# Patient Record
Sex: FEMALE | Race: WHITE | NOT HISPANIC OR LATINO | Employment: FULL TIME | URBAN - METROPOLITAN AREA
[De-identification: names, ages, dates, MRNs, and addresses within clinical notes are randomized per-mention and may not be internally consistent; named-entity substitution may affect disease eponyms.]

---

## 2019-09-27 ENCOUNTER — TELEPHONE (OUTPATIENT)
Dept: FAMILY MEDICINE | Facility: CLINIC | Age: 29
End: 2019-09-27

## 2019-09-27 NOTE — TELEPHONE ENCOUNTER
Patient states she would really like to have you as her provider for her pregnancy as her Muslim only allows her to see a female provider and you have seen her sister Rocio through two of her pregnancies.  Patient has not been seen for her pregnancy yet and she is due in April.  Please call.    Thank you.

## 2019-09-27 NOTE — TELEPHONE ENCOUNTER
Yes I can take her as a patient, she needs to confirm she can deliver at [facility].

## 2019-09-30 NOTE — TELEPHONE ENCOUNTER
Had  serviced call patient at [phone],  no answer, he left a message for patient to check with insurance for coverage at [facility] to deliver baby.   MERCEDES Baugh

## 2019-10-03 NOTE — TELEPHONE ENCOUNTER
Reason for call:  Other   Patient called regarding (reason for call): appointment    Additional comments: pt called in. She is wondering what is the next step in starting her care    Phone number to reach patient:  Home number on file [phone] (home)    Best Time:      Can we leave a detailed message on this number?  YES

## 2019-10-04 NOTE — TELEPHONE ENCOUNTER
Patient is scheduled on 10/08/19 for new prenatal, can deliver at [facility]. Patient was confirmed pregnancy in [place].  Believes she is 16 weeks now. No  is needed.   MERCEDES Baugh

## 2019-10-08 ENCOUNTER — PRENATAL OFFICE VISIT (OUTPATIENT)
Dept: FAMILY MEDICINE | Facility: CLINIC | Age: 29
End: 2019-10-08
Payer: MEDICAID

## 2019-10-08 VITALS
TEMPERATURE: 98.2 F | DIASTOLIC BLOOD PRESSURE: 74 MMHG | WEIGHT: 180.8 LBS | SYSTOLIC BLOOD PRESSURE: 119 MMHG | HEART RATE: 85 BPM | HEIGHT: 65 IN | BODY MASS INDEX: 30.12 KG/M2 | RESPIRATION RATE: 16 BRPM

## 2019-10-08 DIAGNOSIS — Z34.82 ENCOUNTER FOR SUPERVISION OF OTHER NORMAL PREGNANCY, SECOND TRIMESTER: Primary | ICD-10-CM

## 2019-10-08 DIAGNOSIS — L85.3 DRY SKIN: ICD-10-CM

## 2019-10-08 DIAGNOSIS — Z23 NEED FOR PROPHYLACTIC VACCINATION AND INOCULATION AGAINST INFLUENZA: ICD-10-CM

## 2019-10-08 LAB
ERYTHROCYTE [DISTWIDTH] IN BLOOD BY AUTOMATED COUNT: 14.3 % (ref 10–15)
HCT VFR BLD AUTO: 36.3 % (ref 35–47)
HGB BLD-MCNC: 12 G/DL (ref 11.7–15.7)
MCH RBC QN AUTO: 28.9 PG (ref 26.5–33)
MCHC RBC AUTO-ENTMCNC: 33.1 G/DL (ref 31.5–36.5)
MCV RBC AUTO: 88 FL (ref 78–100)
PLATELET # BLD AUTO: 277 10E9/L (ref 150–450)
RBC # BLD AUTO: 4.15 10E12/L (ref 3.8–5.2)
WBC # BLD AUTO: 9.1 10E9/L (ref 4–11)

## 2019-10-08 PROCEDURE — 86762 RUBELLA ANTIBODY: CPT | Performed by: FAMILY MEDICINE

## 2019-10-08 PROCEDURE — 86850 RBC ANTIBODY SCREEN: CPT | Performed by: FAMILY MEDICINE

## 2019-10-08 PROCEDURE — 85027 COMPLETE CBC AUTOMATED: CPT | Performed by: FAMILY MEDICINE

## 2019-10-08 PROCEDURE — 86900 BLOOD TYPING SEROLOGIC ABO: CPT | Performed by: FAMILY MEDICINE

## 2019-10-08 PROCEDURE — 84439 ASSAY OF FREE THYROXINE: CPT | Performed by: FAMILY MEDICINE

## 2019-10-08 PROCEDURE — 86780 TREPONEMA PALLIDUM: CPT | Performed by: FAMILY MEDICINE

## 2019-10-08 PROCEDURE — 87389 HIV-1 AG W/HIV-1&-2 AB AG IA: CPT | Performed by: FAMILY MEDICINE

## 2019-10-08 PROCEDURE — 90471 IMMUNIZATION ADMIN: CPT | Performed by: FAMILY MEDICINE

## 2019-10-08 PROCEDURE — 36415 COLL VENOUS BLD VENIPUNCTURE: CPT | Performed by: FAMILY MEDICINE

## 2019-10-08 PROCEDURE — G0499 HEPB SCREEN HIGH RISK INDIV: HCPCS | Performed by: FAMILY MEDICINE

## 2019-10-08 PROCEDURE — 99207 ZZC FIRST OB VISIT: CPT | Performed by: FAMILY MEDICINE

## 2019-10-08 PROCEDURE — 86901 BLOOD TYPING SEROLOGIC RH(D): CPT | Performed by: FAMILY MEDICINE

## 2019-10-08 PROCEDURE — 87491 CHLMYD TRACH DNA AMP PROBE: CPT | Performed by: FAMILY MEDICINE

## 2019-10-08 PROCEDURE — G0145 SCR C/V CYTO,THINLAYER,RESCR: HCPCS | Performed by: FAMILY MEDICINE

## 2019-10-08 PROCEDURE — 86787 VARICELLA-ZOSTER ANTIBODY: CPT | Performed by: FAMILY MEDICINE

## 2019-10-08 PROCEDURE — 90686 IIV4 VACC NO PRSV 0.5 ML IM: CPT | Performed by: FAMILY MEDICINE

## 2019-10-08 PROCEDURE — 87086 URINE CULTURE/COLONY COUNT: CPT | Performed by: FAMILY MEDICINE

## 2019-10-08 PROCEDURE — 84443 ASSAY THYROID STIM HORMONE: CPT | Performed by: FAMILY MEDICINE

## 2019-10-08 RX ORDER — MINERAL OIL/HYDROPHIL PETROLAT
OINTMENT (GRAM) TOPICAL
Qty: 420 G | Refills: 3 | Status: SHIPPED | OUTPATIENT
Start: 2019-10-08 | End: 2019-11-08

## 2019-10-08 RX ORDER — PNV NO.95/FERROUS FUM/FOLIC AC 28MG-0.8MG
1 TABLET ORAL DAILY
Qty: 100 TABLET | Refills: 3 | Status: SHIPPED | OUTPATIENT
Start: 2019-10-08 | End: 2021-08-04

## 2019-10-08 ASSESSMENT — MIFFLIN-ST. JEOR: SCORE: 1550.98

## 2019-10-08 NOTE — PROGRESS NOTES
"Dax Beckman  is a 29 year old  who presents to the clinic for a new ob visit.    Estimated Date of Delivery: Apr 10, 2020 is calculated from Patient's last menstrual period was 2019.   She has not had bleeding since her LMP.   She has not had nausea. Weigh loss has not occurred.   This was a planned pregnancy.   FOB is involved,  Away in [place]   OTHER CONCERNS: fatigue - improving now, itchy    OB History    Para Term  AB Living   3 2 2 0 0 2   SAB TAB Ectopic Multiple Live Births   0 0 0 0 2      # Outcome Date GA Lbr Ariel/2nd Weight Sex Delivery Anes PTL Lv   3 Current            2 Term 18    M    BEAU   1 Term 12    F    BEAU      INFECTION HISTORY  HIV: No  Hepatitis B: No  Hepatitis C: No  Syphilis:  No  Tuberculosis: no   PPD- no  Herpes self: no  Herpes partner:  no  Chlamydia:  no  Gonorrhea:  no  HPV: no  BV:  no  Trichomonis:  no  Chicken Pox:  unsure  ====================================================  PERSONAL/SOCIAL HISTORY  Lives lives with their spouse.  Employment: Full time at home.  Her job involves moderate activity .  Additional items: None  =====================================================   REVIEW OF SYSTEMS  CONSTITUTIONAL: NEGATIVE for fever, chills  EYES: NEGATIVE for vision changes   RESP: NEGATIVE for significant cough or SOB  CV: NEGATIVE for chest pain, palpitations   GI: NEGATIVE for nausea, abdominal pain, heartburn, or change in bowel habits  : NEGATIVE for frequency, dysuria, or hematuria  MUSCULOSKELETAL: NEGATIVE for significant arthralgias or myalgia  NEURO: NEGATIVE for weakness, dizziness or paresthesias or headache  ====================================================  PHYSICAL EXAM:  /74   Pulse 85   Temp 98.2  F (36.8  C) (Oral)   Resp 16   Ht 1.651 m (5' 5\")   Wt 82 kg (180 lb 12.8 oz)   LMP 2019   BMI 30.09 kg/m    BMI- Body mass index is 30.09 kg/m .,     RECOMMENDED WEIGHT GAIN: 15-25 lbs.  GENERAL: "  Pleasant pregnant female, alert, well groomed.  SKIN:  Warm and dry, without lesions or rashes  HEAD: Symmetrical features.  EYES:  PERRLA,   MOUTH:  Buccal mucosa pink, moist without lesions.    NECK:  Thyroid without enlargement and nodules.  Lymph nodes not palpable.   LUNGS:  Clear to auscultation.  HEART:  RRR without murmur.  ABDOMEN: Soft without masses , tenderness or organomegaly.  No CVA tenderness. No scars noted..   MENTAL STATUS:  Alert, oriented x3.  Speech fluent with normal naming, repetition, comprehension.   CRANIAL NERVES:   Pupils are equal, round, reactive to light.  Extraocular movements full.  Visual fields full.  Facial sensation, movement normal.  Palate moves symmetrically.  Tongue midline.  Sternocleidomastoid and trapezius strength intact.    Motor 5/5.  Reflexes 2/4.  EXTREMITIES:  No edema. No significant varicosities.   GENITALIA:  BUS WNL, no lesions noted   VAGINA:  Pink, normal rugae and discharge normal and physiologic,   CERVIX:  smooth, without discharge or CMT and parous os,   firm/ closed 3 cm long.  UTERUS: Midposition, nontender 15 weeks in size.  ADNEXA: Without masses or tenderness  PELVIS:   Adequate, Pelvis proven to unknown as not weighed in [place].  .      =========================================  ASSESSMENT:  (Z34.82) Encounter for supervision of other normal pregnancy, second trimester  (primary encounter diagnosis)  Comment: see below  Plan: CBC with platelets, Rubella Antibody IgG         Quantitative, Treponema Abs w Reflex to RPR and        Titer, Hepatitis B surface antigen, HIV Antigen        Antibody Combo, Chlamydia trachomatis PCR,         Urine Culture Aerobic Bacterial, TSH with free         T4 reflex, ABO/Rh type and screen, Varicella         Zoster Virus Antibody IgG, Pap imaged thin         layer screen reflex to HPV if ASCUS - recommend        age 25 - 29             PREGNANCY AT RISK?  no  ==========================================  PLAN:  Instructed on use of triage nurse line and contacting the on call provider after hours for an urgent need such as fever, vagina bleeding, bladder or vaginal infection, rupture of membranes,  or term labor.    Discussed the indications, uses for and false positives for quad screen, nuchal translucency and fetal survey ultrasound at 18-20 weeks gestation. Will inform us at the next visit if she wished to avail herself of these screens.  Instructed on best evidence for: weight gain for her BMI for pregnancy; healthy diet and foods to avoid; exercise and activity during pregnancy;avoiding exposure to toxoplasmosis; and maintenance of a generally healthy lifestyle.   Discussed the harms, benefits, side effects and alternative therapies for current prescribed and OTC medications.

## 2019-10-08 NOTE — LETTER
October 11, 2019      Dax Beckman  [address] [place] [address]  [place] [address]    Dear ,      I am happy to inform you that your recent cervical cancer screening test (PAP smear) was normal.      Preventative screenings such as this help to ensure your health for years to come. You should repeat a pap smear in 3 years, unless otherwise directed.      You will still need to return to the clinic every year for your annual exam and other preventive tests.     If you have additional questions regarding this result, please call our registered nurse, Flor at [phone].      Sincerely,      Georgina Gandhi MD/mazin

## 2019-10-08 NOTE — PATIENT INSTRUCTIONS
Report to or call [facility] [phone] for concerns about pregnancy or Labor.      Next visit in 4 weks

## 2019-10-08 NOTE — Clinical Note
Please abstract the following data from this visit with this patient into the appropriate field in Epic:Tests that can be patient reported without a hard copy:Pap smear done on this date: 10/21/16 (approximately), by this group: [facility], results were found in care everywhere . Other Tests found in the patient's chart through Chart Review/Care Everywhere:{Abstract Quality List (Optional):760422}Note to Abstraction: If this section is blank, no results were found via Chart Review/Care Everywhere.

## 2019-10-08 NOTE — NURSING NOTE
"Chief Complaint   Patient presents with     Prenatal Care     GEMMA 4/10/20, headaches noted       Initial /74   Pulse 85   Temp 98.2  F (36.8  C) (Oral)   Resp 16   Ht 1.651 m (5' 5\")   Wt 82 kg (180 lb 12.8 oz)   LMP 06/01/2019   BMI 30.09 kg/m   Estimated body mass index is 30.09 kg/m  as calculated from the following:    Height as of this encounter: 1.651 m (5' 5\").    Weight as of this encounter: 82 kg (180 lb 12.8 oz).  Medication Reconciliation: complete  Shin Lopez CMA    "

## 2019-10-08 NOTE — LETTER
October 10, 2019    Dax Beckman  [address]    [place] [address]            Dear Dax,    Your recent labs were normal, see you in a few weeks.       Below is a copy of the results.  It was a pleasure to see you at your last appointment.    If you have any questions or concerns, please call myself or my nurse at [phone].    Sincerely,    Georgina Gandhi MD / opal    Results for orders placed or performed in visit on 10/08/19   CBC with platelets   Result Value Ref Range    WBC 9.1 4.0 - 11.0 10e9/L    RBC Count 4.15 3.8 - 5.2 10e12/L    Hemoglobin 12.0 11.7 - 15.7 g/dL    Hematocrit 36.3 35.0 - 47.0 %    MCV 88 78 - 100 fl    MCH 28.9 26.5 - 33.0 pg    MCHC 33.1 31.5 - 36.5 g/dL    RDW 14.3 10.0 - 15.0 %    Platelet Count 277 150 - 450 10e9/L   Rubella Antibody IgG Quantitative   Result Value Ref Range    Rubella Antibody IgG Quantitative 77 IU/mL   Treponema Abs w Reflex to RPR and Titer   Result Value Ref Range    Treponema Antibodies Nonreactive NR^Nonreactive   Hepatitis B surface antigen   Result Value Ref Range    Hep B Surface Agn Nonreactive NR^Nonreactive   HIV Antigen Antibody Combo   Result Value Ref Range    HIV Antigen Antibody Combo Nonreactive NR^Nonreactive       TSH with free T4 reflex   Result Value Ref Range    TSH 7.45 (H) 0.40 - 4.00 mU/L   Varicella Zoster Virus Antibody IgG   Result Value Ref Range    Varicella Zoster Virus Antibody IgG 6.3 (H) 0.0 - 0.8 AI   T4 free   Result Value Ref Range    T4 Free 0.91 0.76 - 1.46 ng/dL   ABO/Rh type and screen   Result Value Ref Range    ABO B     RH(D) Pos     Antibody Screen Neg     Test Valid Only At [facility]        Specimen Expires 10/11/2019    Chlamydia trachomatis PCR   Result Value Ref Range    Specimen Description Endocervical     Chlamydia Trachomatis PCR Negative NEG^Negative   Urine Culture Aerobic Bacterial   Result Value Ref Range    Specimen Description Midstream Urine     Culture Micro <10,000  colonies/mL  mixed urogenital george

## 2019-10-09 LAB
ABO + RH BLD: NORMAL
ABO + RH BLD: NORMAL
BACTERIA SPEC CULT: NORMAL
BLD GP AB SCN SERPL QL: NORMAL
BLOOD BANK CMNT PATIENT-IMP: NORMAL
C TRACH DNA SPEC QL NAA+PROBE: NEGATIVE
HBV SURFACE AG SERPL QL IA: NONREACTIVE
HIV 1+2 AB+HIV1 P24 AG SERPL QL IA: NONREACTIVE
RUBV IGG SERPL IA-ACNC: 77 IU/ML
SPECIMEN EXP DATE BLD: NORMAL
SPECIMEN SOURCE: NORMAL
SPECIMEN SOURCE: NORMAL
T PALLIDUM AB SER QL: NONREACTIVE
T4 FREE SERPL-MCNC: 0.91 NG/DL (ref 0.76–1.46)
TSH SERPL DL<=0.005 MIU/L-ACNC: 7.45 MU/L (ref 0.4–4)
VZV IGG SER QL IA: 6.3 AI (ref 0–0.8)

## 2019-10-11 LAB
COPATH REPORT: NORMAL
PAP: NORMAL

## 2019-11-08 ENCOUNTER — PRENATAL OFFICE VISIT (OUTPATIENT)
Dept: FAMILY MEDICINE | Facility: CLINIC | Age: 29
End: 2019-11-08
Payer: MEDICAID

## 2019-11-08 VITALS
TEMPERATURE: 97.7 F | BODY MASS INDEX: 31.12 KG/M2 | SYSTOLIC BLOOD PRESSURE: 120 MMHG | DIASTOLIC BLOOD PRESSURE: 77 MMHG | HEART RATE: 89 BPM | WEIGHT: 187 LBS

## 2019-11-08 DIAGNOSIS — Z23 NEED FOR TDAP VACCINATION: Primary | ICD-10-CM

## 2019-11-08 DIAGNOSIS — Z34.82 ENCOUNTER FOR SUPERVISION OF OTHER NORMAL PREGNANCY IN SECOND TRIMESTER: ICD-10-CM

## 2019-11-08 PROCEDURE — 99207 ZZC PRENATAL VISIT: CPT | Performed by: FAMILY MEDICINE

## 2019-11-08 PROCEDURE — 90715 TDAP VACCINE 7 YRS/> IM: CPT | Performed by: FAMILY MEDICINE

## 2019-11-08 PROCEDURE — 90471 IMMUNIZATION ADMIN: CPT | Performed by: FAMILY MEDICINE

## 2019-11-08 NOTE — PROGRESS NOTES
Doing well.  No concerns today.  Prenatal flowsheet information is reviewed.  Declines Quad screen  Discussed kick counts and fetal movement.  Reportable signs and symptoms discussed.  Next visit 4 weeks.  US ordered   Tdap given     Prior to immunization administration, verified patients identity using patient s name and date of birth. Please see Immunization Activity for additional information.     Screening Questionnaire for Adult Immunization    Are you sick today?   No   Do you have allergies to medications, food, a vaccine component or latex?   No   Have you ever had a serious reaction after receiving a vaccination?   No   Do you have a long-term health problem with heart disease, lung disease, asthma, kidney disease, metabolic disease (e.g. diabetes), anemia, or other blood disorder?   No   Do you have cancer, leukemia, HIV/AIDS, or any other immune system problem?   No   In the past 3 months, have you taken medications that affect  your immune system, such as prednisone, other steroids, or anticancer drugs; drugs for the treatment of rheumatoid arthritis, Crohn s disease, or psoriasis; or have you had radiation treatments?   No   Have you had a seizure, or a brain or other nervous system problem?   No   During the past year, have you received a transfusion of blood or blood     products, or been given immune (gamma) globulin or antiviral drug?   No   For women: Are you pregnant or is there a chance you could become        pregnant during the next month?   No   Have you received any vaccinations in the past 4 weeks?   No     Immunization questionnaire answers were all negative.        Per orders of Dr. Gandhi, injection of Tdap given by Rosalinda Ivan LPN. Patient instructed to remain in clinic for 15 minutes afterwards, and to report any adverse reaction to me immediately.       Screening performed by Rosalinda Ivan LPN on 11/8/2019 at 12:28 PM.

## 2019-11-08 NOTE — PATIENT INSTRUCTIONS
Report to or call [facility] [phone] for concerns about pregnancy or Labor.      Next visit 4 weeks    Please  call [phone] to schedule your ultrasound.

## 2019-11-11 ENCOUNTER — ANCILLARY PROCEDURE (OUTPATIENT)
Dept: ULTRASOUND IMAGING | Facility: CLINIC | Age: 29
End: 2019-11-11
Attending: FAMILY MEDICINE
Payer: MEDICAID

## 2019-11-11 DIAGNOSIS — Z34.82 ENCOUNTER FOR SUPERVISION OF OTHER NORMAL PREGNANCY IN SECOND TRIMESTER: ICD-10-CM

## 2019-11-11 PROCEDURE — 76805 OB US >/= 14 WKS SNGL FETUS: CPT

## 2019-12-02 ENCOUNTER — PRENATAL OFFICE VISIT (OUTPATIENT)
Dept: FAMILY MEDICINE | Facility: CLINIC | Age: 29
End: 2019-12-02
Payer: COMMERCIAL

## 2019-12-02 VITALS
RESPIRATION RATE: 16 BRPM | BODY MASS INDEX: 32.62 KG/M2 | TEMPERATURE: 98.3 F | HEART RATE: 87 BPM | SYSTOLIC BLOOD PRESSURE: 128 MMHG | DIASTOLIC BLOOD PRESSURE: 75 MMHG | WEIGHT: 196 LBS

## 2019-12-02 DIAGNOSIS — Z34.82 ENCOUNTER FOR SUPERVISION OF OTHER NORMAL PREGNANCY IN SECOND TRIMESTER: ICD-10-CM

## 2019-12-02 LAB
GLUCOSE 1H P 50 G GLC PO SERPL-MCNC: 100 MG/DL (ref 60–129)
HGB BLD-MCNC: 11.3 G/DL (ref 11.7–15.7)

## 2019-12-02 PROCEDURE — 36415 COLL VENOUS BLD VENIPUNCTURE: CPT | Performed by: FAMILY MEDICINE

## 2019-12-02 PROCEDURE — 99207 ZZC PRENATAL VISIT: CPT | Performed by: FAMILY MEDICINE

## 2019-12-02 PROCEDURE — 00000218 ZZHCL STATISTIC OBHBG - HEMOGLOBIN: Performed by: FAMILY MEDICINE

## 2019-12-02 PROCEDURE — 82950 GLUCOSE TEST: CPT | Performed by: FAMILY MEDICINE

## 2019-12-02 NOTE — PATIENT INSTRUCTIONS
Report to or call [facility] [phone] for concerns about pregnancy or Labor.    Next visit 4 weeks    Please  call [phone] to schedule your ultrasound.

## 2019-12-02 NOTE — LETTER
December 3, 2019    Dax Beckman  [address]    [place] [address]            Dear Dax,    Your recent labs were normal, see you in a few weeks.     Below is a copy of the results.    If you have any questions or concerns, please call myself or my nurse at [phone].    Sincerely,    Georgina Gandhi MD / opal    Results for orders placed or performed in visit on 12/02/19   OB hemoglobin     Status: Abnormal   Result Value Ref Range    Hemoglobin 11.3 (L) 11.7 - 15.7 g/dL   Glucose tolerance gest screen 1 hour     Status: None   Result Value Ref Range    Glu Gest Screen 1hr 50g 100 60 - 129 mg/dL

## 2019-12-02 NOTE — PROGRESS NOTES
Doing well.  No concerns today.  Prenatal flowsheet information is reviewed.  Discussed kick counts and fetal movement.  Reportable signs and symptoms discussed.  Change of due date from the one she reported she was given in [place]  US at 20 weeks more in line with due date from reported LMP.  Will do 1 hr gtt today due to change in dates  Next visit 4 weeks

## 2019-12-02 NOTE — NURSING NOTE
"Chief Complaint   Patient presents with     Prenatal Care       Initial /75   Pulse 87   Temp 98.3  F (36.8  C) (Oral)   Resp 16   Wt 88.9 kg (196 lb)   LMP 06/01/2019   BMI 32.62 kg/m   Estimated body mass index is 32.62 kg/m  as calculated from the following:    Height as of 10/8/19: 1.651 m (5' 5\").    Weight as of this encounter: 88.9 kg (196 lb).  Medication Reconciliation: complete  Shin Lopez, SANG    "

## 2019-12-04 ENCOUNTER — TELEPHONE (OUTPATIENT)
Dept: FAMILY MEDICINE | Facility: CLINIC | Age: 29
End: 2019-12-04

## 2019-12-05 NOTE — TELEPHONE ENCOUNTER
[employer] Form for gift card placed in providers basket to complete. Route back to team when complete.  MERCEDES Baugh

## 2020-01-05 PROBLEM — Z34.83 ENCOUNTER FOR SUPERVISION OF OTHER NORMAL PREGNANCY IN THIRD TRIMESTER: Status: ACTIVE | Noted: 2019-11-08

## 2020-01-07 ENCOUNTER — PRENATAL OFFICE VISIT (OUTPATIENT)
Dept: FAMILY MEDICINE | Facility: CLINIC | Age: 30
End: 2020-01-07

## 2020-01-07 VITALS
WEIGHT: 205.6 LBS | DIASTOLIC BLOOD PRESSURE: 76 MMHG | SYSTOLIC BLOOD PRESSURE: 111 MMHG | HEART RATE: 82 BPM | HEIGHT: 66 IN | BODY MASS INDEX: 33.04 KG/M2 | RESPIRATION RATE: 16 BRPM | TEMPERATURE: 98.1 F

## 2020-01-07 DIAGNOSIS — Z34.83 ENCOUNTER FOR SUPERVISION OF OTHER NORMAL PREGNANCY IN THIRD TRIMESTER: ICD-10-CM

## 2020-01-07 PROCEDURE — 99207 ZZC PRENATAL VISIT: CPT | Performed by: FAMILY MEDICINE

## 2020-01-07 ASSESSMENT — MIFFLIN-ST. JEOR: SCORE: 1671.41

## 2020-01-07 NOTE — PROGRESS NOTES
Doing well.  No concerns today.  Prenatal flowsheet information is reviewed.  Discussed kick counts and fetal movement.  Reportable signs and symptoms discussed.  F/u 2 weeks

## 2020-01-07 NOTE — PATIENT INSTRUCTIONS
Report to or call [facility] [phone] for concerns about pregnancy or Labor.      Next visit 2 weeks      Please  call [phone] to schedule your ultrasound.

## 2020-01-07 NOTE — NURSING NOTE
"Chief Complaint   Patient presents with     Prenatal Care       Initial /76   Pulse 82   Temp 98.1  F (36.7  C) (Oral)   Resp 16   Ht 1.664 m (5' 5.5\")   Wt 93.3 kg (205 lb 9.6 oz)   LMP 06/01/2019   BMI 33.69 kg/m   Estimated body mass index is 33.69 kg/m  as calculated from the following:    Height as of this encounter: 1.664 m (5' 5.5\").    Weight as of this encounter: 93.3 kg (205 lb 9.6 oz).  Medication Reconciliation: complete  Shin Lopez CMA    "

## 2020-01-13 ENCOUNTER — ANCILLARY PROCEDURE (OUTPATIENT)
Dept: ULTRASOUND IMAGING | Facility: CLINIC | Age: 30
End: 2020-01-13
Attending: FAMILY MEDICINE
Payer: COMMERCIAL

## 2020-01-13 DIAGNOSIS — Z34.82 ENCOUNTER FOR SUPERVISION OF OTHER NORMAL PREGNANCY IN SECOND TRIMESTER: ICD-10-CM

## 2020-01-13 PROCEDURE — 76816 OB US FOLLOW-UP PER FETUS: CPT

## 2020-01-21 ENCOUNTER — PRENATAL OFFICE VISIT (OUTPATIENT)
Dept: FAMILY MEDICINE | Facility: CLINIC | Age: 30
End: 2020-01-21
Payer: COMMERCIAL

## 2020-01-21 VITALS
TEMPERATURE: 98.1 F | HEART RATE: 88 BPM | BODY MASS INDEX: 33.96 KG/M2 | SYSTOLIC BLOOD PRESSURE: 123 MMHG | RESPIRATION RATE: 16 BRPM | DIASTOLIC BLOOD PRESSURE: 77 MMHG | WEIGHT: 207.2 LBS

## 2020-01-21 DIAGNOSIS — Z34.83 ENCOUNTER FOR SUPERVISION OF OTHER NORMAL PREGNANCY IN THIRD TRIMESTER: ICD-10-CM

## 2020-01-21 DIAGNOSIS — R14.2 FLATULENCE, ERUCTATION AND GAS PAIN: Primary | ICD-10-CM

## 2020-01-21 DIAGNOSIS — R14.1 FLATULENCE, ERUCTATION AND GAS PAIN: Primary | ICD-10-CM

## 2020-01-21 DIAGNOSIS — R14.3 FLATULENCE, ERUCTATION AND GAS PAIN: Primary | ICD-10-CM

## 2020-01-21 PROCEDURE — 99207 ZZC PRENATAL VISIT: CPT | Performed by: FAMILY MEDICINE

## 2020-01-21 NOTE — PROGRESS NOTES
Doing well.  No concerns today.  Prenatal flowsheet information is reviewed.  Discussed kick counts and fetal movement.  Reportable signs and symptoms discussed.  US showed Breech position, reviewed plan if remains breech.  Will need OB consult to discuss version vs .  Pt to clarify with insurance about planned section coverage at [facility].  F/u 2 weeks.

## 2020-01-21 NOTE — PATIENT INSTRUCTIONS
Report to or call [facility] [phone] for concerns about pregnancy or Labor.    Next visit 2 weeks    Complete preregistration at [facility].

## 2020-01-21 NOTE — NURSING NOTE
"Chief Complaint   Patient presents with     Prenatal Care       Initial /77   Pulse 88   Temp 98.1  F (36.7  C) (Oral)   Resp 16   Wt 94 kg (207 lb 3.2 oz)   LMP 06/01/2019   BMI 33.96 kg/m   Estimated body mass index is 33.96 kg/m  as calculated from the following:    Height as of 1/7/20: 1.664 m (5' 5.5\").    Weight as of this encounter: 94 kg (207 lb 3.2 oz).  Medication Reconciliation: complete  Shin Lopez CMA    "

## 2020-02-04 ENCOUNTER — TELEPHONE (OUTPATIENT)
Dept: FAMILY MEDICINE | Facility: CLINIC | Age: 30
End: 2020-02-04

## 2020-02-06 ENCOUNTER — TELEPHONE (OUTPATIENT)
Dept: FAMILY MEDICINE | Facility: CLINIC | Age: 30
End: 2020-02-06

## 2020-02-06 NOTE — TELEPHONE ENCOUNTER
Patient is coming in on 02/11/20, states she is feeling fine and can wait until appointment to see Dr Gandhi.  MERCEDES Baugh

## 2020-02-07 ENCOUNTER — TELEPHONE (OUTPATIENT)
Dept: FAMILY MEDICINE | Facility: CLINIC | Age: 30
End: 2020-02-07

## 2020-02-11 ENCOUNTER — PRENATAL OFFICE VISIT (OUTPATIENT)
Dept: FAMILY MEDICINE | Facility: CLINIC | Age: 30
End: 2020-02-11
Payer: COMMERCIAL

## 2020-02-11 VITALS
DIASTOLIC BLOOD PRESSURE: 70 MMHG | HEART RATE: 97 BPM | BODY MASS INDEX: 34.15 KG/M2 | WEIGHT: 208.4 LBS | TEMPERATURE: 97.9 F | SYSTOLIC BLOOD PRESSURE: 113 MMHG | RESPIRATION RATE: 16 BRPM

## 2020-02-11 DIAGNOSIS — J06.9 VIRAL UPPER RESPIRATORY TRACT INFECTION: ICD-10-CM

## 2020-02-11 DIAGNOSIS — Z34.83 ENCOUNTER FOR SUPERVISION OF OTHER NORMAL PREGNANCY IN THIRD TRIMESTER: Primary | ICD-10-CM

## 2020-02-11 PROCEDURE — 99207 ZZC PRENATAL VISIT: CPT | Performed by: FAMILY MEDICINE

## 2020-02-11 PROCEDURE — 87653 STREP B DNA AMP PROBE: CPT | Performed by: FAMILY MEDICINE

## 2020-02-11 RX ORDER — ACETAMINOPHEN 325 MG/1
325-650 TABLET ORAL EVERY 6 HOURS PRN
Qty: 100 TABLET | Refills: 1 | Status: SHIPPED | OUTPATIENT
Start: 2020-02-11

## 2020-02-11 NOTE — PROGRESS NOTES
"Doing well.  Runny nose cough no fever.  Cervix is posterior, finger-tip dilated and soft.  Cephalic position confirmed by Leopold maneuvers, \"quick-look\" ultrasound and cervical exam.  GBS done today.  Prenatal flowsheet information is reviewed.  Discussed signs of labor and when to call or come in.  Discussed kick counts and fetal movement.  Reportable signs and symptoms discussed.  Reviewed OTC cold meds to use during pregnancy  F/u 1 week   "

## 2020-02-11 NOTE — LETTER
February 13, 2020      Dax Beckman  [address]    [place] [address]        Dear ,    We are writing to inform you of your test results.    Your Group B strep test was negative.  You will not need antibiotics during labor unless you have a fever.  See you soon.    Resulted Orders   Group B strep PCR   Result Value Ref Range    Group B Strep PCR Spec Lior Vaginal Rectal     Group B Strep PCR Negative NEG^Negative      Comment:      No GBS DNA detected, presumed negative for GBS or number of bacteria may be   below the limit of detection of the assay.  Assay performed on incubated broth culture of specimen using [employer] real-time   PCR.         If you have any questions or concerns, please call the clinic at the number listed above.       Sincerely,        Georgina Gandhi MD / opal

## 2020-02-11 NOTE — NURSING NOTE
"Chief Complaint   Patient presents with     Prenatal Care     Congestion x 1 day, cough worse at night, feeling hot today        Initial /70   Pulse 97   Temp 97.9  F (36.6  C) (Oral)   Resp 16   Wt 94.5 kg (208 lb 6.4 oz)   LMP 06/01/2019   BMI 34.15 kg/m   Estimated body mass index is 34.15 kg/m  as calculated from the following:    Height as of 1/7/20: 1.664 m (5' 5.5\").    Weight as of this encounter: 94.5 kg (208 lb 6.4 oz).  Medication Reconciliation: complete  Shin Lopez CMA    "

## 2020-02-12 LAB
GP B STREP DNA SPEC QL NAA+PROBE: NEGATIVE
SPECIMEN SOURCE: NORMAL

## 2020-02-18 NOTE — PROGRESS NOTES
Doing well.  No concerns today.  Cervix check next visit.  GBS neg.  Prenatal flowsheet information is reviewed.  Discussed signs of labor and when to call or come in.  Discussed kick counts and fetal movement.  Reportable signs and symptoms discussed.  Weekly visits

## 2020-02-18 NOTE — PATIENT INSTRUCTIONS
[facility] [phone] for concerns about pregnancy or Labor.      Next visit 1 week

## 2020-02-19 ENCOUNTER — PRENATAL OFFICE VISIT (OUTPATIENT)
Dept: FAMILY MEDICINE | Facility: CLINIC | Age: 30
End: 2020-02-19
Payer: COMMERCIAL

## 2020-02-19 VITALS
DIASTOLIC BLOOD PRESSURE: 78 MMHG | HEART RATE: 76 BPM | RESPIRATION RATE: 20 BRPM | SYSTOLIC BLOOD PRESSURE: 126 MMHG | WEIGHT: 211.4 LBS | BODY MASS INDEX: 34.64 KG/M2 | TEMPERATURE: 98.1 F

## 2020-02-19 DIAGNOSIS — Z34.83 ENCOUNTER FOR SUPERVISION OF OTHER NORMAL PREGNANCY IN THIRD TRIMESTER: ICD-10-CM

## 2020-02-19 PROCEDURE — 99207 ZZC PRENATAL VISIT: CPT | Performed by: FAMILY MEDICINE

## 2020-02-19 NOTE — NURSING NOTE
"Chief Complaint   Patient presents with     Prenatal Care       Initial /78   Pulse 76   Temp 98.1  F (36.7  C) (Oral)   Resp 20   Wt 95.9 kg (211 lb 6.4 oz)   LMP 06/01/2019   BMI 34.64 kg/m   Estimated body mass index is 34.64 kg/m  as calculated from the following:    Height as of 1/7/20: 1.664 m (5' 5.5\").    Weight as of this encounter: 95.9 kg (211 lb 6.4 oz).  Medication Reconciliation: complete  Shin Lopez CMA    "

## 2020-02-25 ENCOUNTER — PRENATAL OFFICE VISIT (OUTPATIENT)
Dept: FAMILY MEDICINE | Facility: CLINIC | Age: 30
End: 2020-02-25
Payer: COMMERCIAL

## 2020-02-25 VITALS
BODY MASS INDEX: 35.3 KG/M2 | DIASTOLIC BLOOD PRESSURE: 83 MMHG | WEIGHT: 215.4 LBS | TEMPERATURE: 98.6 F | RESPIRATION RATE: 18 BRPM | HEART RATE: 80 BPM | SYSTOLIC BLOOD PRESSURE: 125 MMHG

## 2020-02-25 DIAGNOSIS — Z34.83 ENCOUNTER FOR SUPERVISION OF OTHER NORMAL PREGNANCY IN THIRD TRIMESTER: ICD-10-CM

## 2020-02-25 PROCEDURE — 99207 ZZC PRENATAL VISIT: CPT | Performed by: FAMILY MEDICINE

## 2020-02-25 ASSESSMENT — PAIN SCALES - GENERAL: PAINLEVEL: MILD PAIN (2)

## 2020-02-25 NOTE — NURSING NOTE
"Chief Complaint   Patient presents with     Prenatal Care     Baby has less movement x 2 days, noting some contractions, pressure, and cramping, low back pain this AM        Initial /83   Pulse 80   Temp 98.6  F (37  C) (Oral)   Resp 18   Wt 97.7 kg (215 lb 6.4 oz)   LMP 06/01/2019   BMI 35.30 kg/m   Estimated body mass index is 35.3 kg/m  as calculated from the following:    Height as of 1/7/20: 1.664 m (5' 5.5\").    Weight as of this encounter: 97.7 kg (215 lb 6.4 oz).  Medication Reconciliation: complete  Shin Lopez CMA    "

## 2020-02-25 NOTE — PATIENT INSTRUCTIONS
[facility] [phone] for concerns about pregnancy or Labor.    Weekly visits

## 2020-02-25 NOTE — PROGRESS NOTES
Doing well.  No concerns today.  Cervix is closed/40%/-2/soft/mid.  Cephalic position confirmed by Leopold maneuvers and cervical exam.  GBS neg.  Prenatal flowsheet information is reviewed.  Discussed signs of labor and when to call or come in.  Discussed kick counts and fetal movement.  Reportable signs and symptoms discussed.  Weekly visits

## 2020-04-29 NOTE — PROGRESS NOTES
SUBJECTIVE: Dax is here for a 6-week postpartum checkup.    Delivery date was 3/1/2020. She had a  of a viable girl, weight 6 pounds 1 oz., with no complications.  Since delivery, she has not been breast feeding.  She has no signs of infection, bleeding or other complications.  She is not pregnant.  We discussed contraceptions and she has chosen Mirena IUD.  She  has had intercourse since delivery and complains of trace discomfort. Patient screened for postpartum depression and complaints are NEGATIVE. Screening has also been completed for intimate partner violence.    EXAM:  Today's Depression Rating was No flowsheet data found.   EPDS: 5    GENERAL healthy, alert and no distress  GYN PELVIC: normal external genitalia, normal urethral meatus, normal vaginal mucosa, normal cervix, normal adnexa, no masses or tenderness and uterus normal size and shape  MS: Extremities normal. No deformaties, edema or skin discoloration.  SKIN: no ulcers, lesions or rash  PSYCH: normal affect, normal cognition    ASSESSMENT:   Normal postpartum exam after .    PLAN:  Return as needed or at time of next expected pap, pelvic, or breast exam.

## 2020-04-30 ENCOUNTER — PRENATAL OFFICE VISIT (OUTPATIENT)
Dept: FAMILY MEDICINE | Facility: CLINIC | Age: 30
End: 2020-04-30
Payer: COMMERCIAL

## 2020-04-30 VITALS
TEMPERATURE: 97.5 F | DIASTOLIC BLOOD PRESSURE: 74 MMHG | BODY MASS INDEX: 33.76 KG/M2 | WEIGHT: 206 LBS | HEART RATE: 92 BPM | SYSTOLIC BLOOD PRESSURE: 112 MMHG | RESPIRATION RATE: 16 BRPM

## 2020-04-30 DIAGNOSIS — Z34.83 ENCOUNTER FOR SUPERVISION OF OTHER NORMAL PREGNANCY IN THIRD TRIMESTER: ICD-10-CM

## 2020-04-30 PROCEDURE — 99207 ZZC POST PARTUM EXAM: CPT | Performed by: FAMILY MEDICINE

## 2020-04-30 NOTE — NURSING NOTE
"Chief Complaint   Patient presents with     Post Partum Exam       Initial /74   Pulse 92   Temp 97.5  F (36.4  C) (Oral)   Resp 16   Wt 93.4 kg (206 lb)   LMP 06/01/2019   Breastfeeding Unknown   BMI 33.76 kg/m   Estimated body mass index is 33.76 kg/m  as calculated from the following:    Height as of 1/7/20: 1.664 m (5' 5.5\").    Weight as of this encounter: 93.4 kg (206 lb).  Medication Reconciliation: complete  Shin Lopez CMA    "

## 2020-05-12 ENCOUNTER — TELEPHONE (OUTPATIENT)
Dept: FAMILY MEDICINE | Facility: CLINIC | Age: 30
End: 2020-05-12

## 2020-05-12 NOTE — TELEPHONE ENCOUNTER
Reason for Call:  Other appointment    Detailed comments: patient is calling to schedule IUD placement. Please call to schedule.Thank you.    Phone Number Patient can be reached at: Home number on file [phone] (home)    Best Time:     Can we leave a detailed message on this number? YES    Call taken on 5/12/2020 at 11:39 AM by Tasha Portillo

## 2020-06-01 ENCOUNTER — TELEPHONE (OUTPATIENT)
Dept: FAMILY MEDICINE | Facility: CLINIC | Age: 30
End: 2020-06-01

## 2020-06-01 NOTE — TELEPHONE ENCOUNTER
LM at [phone], to call TC to reschedule appointment for IUD. Gave [phone]  MERCEDES Baugh

## 2020-06-01 NOTE — TELEPHONE ENCOUNTER
Patient missed her IUD appointment today and would like to reschedule.  Please call to schedule.  Thank you

## 2020-06-16 ENCOUNTER — OFFICE VISIT (OUTPATIENT)
Dept: FAMILY MEDICINE | Facility: CLINIC | Age: 30
End: 2020-06-16
Payer: COMMERCIAL

## 2020-06-16 VITALS
TEMPERATURE: 97.7 F | SYSTOLIC BLOOD PRESSURE: 110 MMHG | BODY MASS INDEX: 33.89 KG/M2 | RESPIRATION RATE: 16 BRPM | DIASTOLIC BLOOD PRESSURE: 78 MMHG | HEART RATE: 86 BPM | WEIGHT: 206.8 LBS

## 2020-06-16 DIAGNOSIS — Z30.430 ENCOUNTER FOR INSERTION OF INTRAUTERINE CONTRACEPTIVE DEVICE: Primary | ICD-10-CM

## 2020-06-16 DIAGNOSIS — L65.9 ALOPECIA: ICD-10-CM

## 2020-06-16 LAB
FERRITIN SERPL-MCNC: 24 NG/ML (ref 12–150)
HCG UR QL: NEGATIVE
TSH SERPL DL<=0.005 MIU/L-ACNC: 3.77 MU/L (ref 0.4–4)

## 2020-06-16 PROCEDURE — 82728 ASSAY OF FERRITIN: CPT | Performed by: FAMILY MEDICINE

## 2020-06-16 PROCEDURE — 81025 URINE PREGNANCY TEST: CPT | Performed by: FAMILY MEDICINE

## 2020-06-16 PROCEDURE — 84443 ASSAY THYROID STIM HORMONE: CPT | Performed by: FAMILY MEDICINE

## 2020-06-16 PROCEDURE — 36415 COLL VENOUS BLD VENIPUNCTURE: CPT | Performed by: FAMILY MEDICINE

## 2020-06-16 PROCEDURE — 58300 INSERT INTRAUTERINE DEVICE: CPT | Performed by: FAMILY MEDICINE

## 2020-06-16 NOTE — PATIENT INSTRUCTIONS
Monitor for symptoms of infection to include a foul smelling discharge, abdominal tenderness or fever without other explanation.  Monitor for heavy bleeding (soaking a maxi pad every hour for 2-3 hours).

## 2020-06-16 NOTE — PROGRESS NOTES
SUBJECTIVE:  Dax Beckman is a 29 year old  female who presents today requesting removal and replacement of an @IUD@.  She is a G 3 P 3 with No LMP recorded. (Menstrual status: Breast Feeding).      PMH/PSH/Meds/All were reviewed and updated at this visit.  Having significant hair loss, more than in past pregnancies.    I discussed the method, effectiveness, contraindications, risk, benefits, alternatives and the insertion procedure itself.  Patient was an appropriate candidate and desired to proceed.    Exam:  Pelvic: External genitalia and vagina normal.     IUD strings visible and grasped with ring forceps, removed easily.   After appropriate preparation, the cervix was grasped with a tenaculum and the uterine cavity sounded to 7 cm.  The Mirena IUD was inserted using standard and sterile technique.  The strings were trimmed to approximately 2.5cm.  No complications. Patient tolerated the procedure well.    IUD Lot#: [id number]  Exp Date: 6/22  NDC#: 50561-881-87    The IUD effectiveness is 3 years.   Followup should be in 3 months with Georgina Gandhi MD.  Alert clinic to any problems.     Check tsh/ferritin for hair loss.

## 2020-06-16 NOTE — NURSING NOTE
"Chief Complaint   Patient presents with     IUD       Initial /78   Pulse 86   Temp 97.7  F (36.5  C) (Oral)   Resp 16   Wt 93.8 kg (206 lb 12.8 oz)   BMI 33.89 kg/m   Estimated body mass index is 33.89 kg/m  as calculated from the following:    Height as of 1/7/20: 1.664 m (5' 5.5\").    Weight as of this encounter: 93.8 kg (206 lb 12.8 oz).  Medication Reconciliation: complete  Shin Lopez CMA    "

## 2020-07-31 ENCOUNTER — OFFICE VISIT (OUTPATIENT)
Dept: URGENT CARE | Facility: URGENT CARE | Age: 30
End: 2020-07-31
Payer: COMMERCIAL

## 2020-07-31 VITALS
TEMPERATURE: 97.9 F | HEART RATE: 72 BPM | OXYGEN SATURATION: 97 % | DIASTOLIC BLOOD PRESSURE: 73 MMHG | SYSTOLIC BLOOD PRESSURE: 117 MMHG

## 2020-07-31 DIAGNOSIS — R30.0 DYSURIA: Primary | ICD-10-CM

## 2020-07-31 LAB
ALBUMIN UR-MCNC: NEGATIVE MG/DL
APPEARANCE UR: CLEAR
BACTERIA #/AREA URNS HPF: ABNORMAL /HPF
BILIRUB UR QL STRIP: NEGATIVE
COLOR UR AUTO: YELLOW
GLUCOSE UR STRIP-MCNC: NEGATIVE MG/DL
HGB UR QL STRIP: ABNORMAL
KETONES UR STRIP-MCNC: NEGATIVE MG/DL
LEUKOCYTE ESTERASE UR QL STRIP: ABNORMAL
NITRATE UR QL: NEGATIVE
NON-SQ EPI CELLS #/AREA URNS LPF: ABNORMAL /LPF
PH UR STRIP: 7 PH (ref 5–7)
RBC #/AREA URNS AUTO: ABNORMAL /HPF
SOURCE: ABNORMAL
SP GR UR STRIP: 1.02 (ref 1–1.03)
UROBILINOGEN UR STRIP-ACNC: 0.2 EU/DL (ref 0.2–1)
WBC #/AREA URNS AUTO: ABNORMAL /HPF

## 2020-07-31 PROCEDURE — 87088 URINE BACTERIA CULTURE: CPT | Performed by: FAMILY MEDICINE

## 2020-07-31 PROCEDURE — 99213 OFFICE O/P EST LOW 20 MIN: CPT | Performed by: FAMILY MEDICINE

## 2020-07-31 PROCEDURE — 87086 URINE CULTURE/COLONY COUNT: CPT | Performed by: FAMILY MEDICINE

## 2020-07-31 PROCEDURE — 87186 SC STD MICRODIL/AGAR DIL: CPT | Performed by: FAMILY MEDICINE

## 2020-07-31 PROCEDURE — 81001 URINALYSIS AUTO W/SCOPE: CPT | Performed by: FAMILY MEDICINE

## 2020-07-31 RX ORDER — AMOXICILLIN 875 MG
875 TABLET ORAL 2 TIMES DAILY
Qty: 14 TABLET | Refills: 0 | Status: SHIPPED | OUTPATIENT
Start: 2020-07-31 | End: 2020-08-07

## 2020-07-31 NOTE — PROGRESS NOTES
Chief complaint: dysuria    2 days ago started having some dysuria and urgency and some frequency   Seems to be a little better today  Denies any possibility of pregnancy declined a pregnancy test  Vaginal discharge or concerns: No  Fever chills: None  Nausea vomiting: None  Flank Pain: None  Patient denies possibility of pregnancy  Denies possibility of STD, declined STD testing.    Problem list and histories reviewed & adjusted, as indicated.  Additional history: as documented    Problem list, Medication list, Allergies, and Medical/Social/Surgical histories reviewed in [facility] and updated as appropriate.    ROS:  Constitutional, HEENT, cardiovascular, pulmonary, gi and gu systems are negative, except as otherwise noted.    OBJECTIVE:                                                    /73   Pulse 72   Temp 97.9  F (36.6  C) (Oral)   SpO2 97%   There is no height or weight on file to calculate BMI.  GENERAL: healthy, alert and no distress  NECK: no adenopathy, no asymmetry, masses, or scars and thyroid normal to palpation  RESP: lungs clear to auscultation - no rales, rhonchi or wheezes  CV: regular rate and rhythm, normal S1 S2, no S3 or S4, no murmur, click or rub, no peripheral edema and peripheral pulses strong  ABDOMEN: soft, nontender, no hepatosplenomegaly, no masses and bowel sounds normal  MS: no gross musculoskeletal defects noted, no edema    Diagnostic Test Results:  Results for orders placed or performed in visit on 07/31/20 (from the past 24 hour(s))   *UA reflex to Microscopic and Culture ([place] and [facility] (except [place] and [place])    Specimen: Midstream Urine   Result Value Ref Range    Color Urine Yellow     Appearance Urine Clear     Glucose Urine Negative NEG^Negative mg/dL    Bilirubin Urine Negative NEG^Negative    Ketones Urine Negative NEG^Negative mg/dL    Specific Gravity Urine 1.020 1.003 - 1.035    Blood Urine Moderate (A) NEG^Negative    pH Urine 7.0 5.0 - 7.0 pH     Protein Albumin Urine Negative NEG^Negative mg/dL    Urobilinogen Urine 0.2 0.2 - 1.0 EU/dL    Nitrite Urine Negative NEG^Negative    Leukocyte Esterase Urine Trace (A) NEG^Negative    Source Midstream Urine    Urine Microscopic   Result Value Ref Range    WBC Urine 5-10 (A) OTO5^0 - 5 /HPF    RBC Urine O - 2 OTO2^O - 2 /HPF    Squamous Epithelial /LPF Urine Few FEW^Few /LPF    Bacteria Urine Few (A) NEG^Negative /HPF        ASSESSMENT/PLAN:                                                        ICD-10-CM    1. Dysuria  R30.0 *UA reflex to Microscopic and Culture ([facility] and [facility] (except [place] and [place])     Urine Microscopic     Urine Culture Aerobic Bacterial     amoxicillin (AMOXIL) 875 MG tablet     Prescribed with amoxicillin - patient is breastfeeding  Patient would like empiric treatment  Pending urine culture   Aware to go to ER or come in immediately if with any fever chills nausea vomiting or flank pain.  Adverse reactions of medications discussed.  Over the counter medications discussed.   Aware to come back in if with worsening symptoms or if no relief despite treatment plan  Patient voiced understanding and had no further questions.     MD Davida Larsen MD  [facility]

## 2020-08-03 LAB
BACTERIA SPEC CULT: ABNORMAL
Lab: ABNORMAL
SPECIMEN SOURCE: ABNORMAL

## 2020-08-31 ENCOUNTER — MEDICAL CORRESPONDENCE (OUTPATIENT)
Dept: HEALTH INFORMATION MANAGEMENT | Facility: CLINIC | Age: 30
End: 2020-08-31

## 2020-10-01 ENCOUNTER — ALLIED HEALTH/NURSE VISIT (OUTPATIENT)
Dept: NURSING | Facility: CLINIC | Age: 30
End: 2020-10-01
Payer: COMMERCIAL

## 2020-10-01 DIAGNOSIS — Z23 NEED FOR PROPHYLACTIC VACCINATION AND INOCULATION AGAINST INFLUENZA: Primary | ICD-10-CM

## 2020-10-01 PROCEDURE — 99207 PR NO CHARGE NURSE ONLY: CPT

## 2020-10-01 PROCEDURE — 90686 IIV4 VACC NO PRSV 0.5 ML IM: CPT

## 2021-07-18 ENCOUNTER — HEALTH MAINTENANCE LETTER (OUTPATIENT)
Age: 31
End: 2021-07-18

## 2021-08-03 ENCOUNTER — NURSE TRIAGE (OUTPATIENT)
Dept: FAMILY MEDICINE | Facility: CLINIC | Age: 31
End: 2021-08-03

## 2021-08-03 NOTE — TELEPHONE ENCOUNTER
UTI return of symptoms from few months ago, looks like no show to follow up appointment in March, let me get you an appointment today or tomorrow, no appointments available today UC is open until 8pm, opt for virtual available tomorrow with [place] provider patient agreeable    Beverly Mark RN, BSN, [facility]  [facility]    Reason for Disposition    Patient wants to be seen    Additional Information    Negative: Severe difficulty breathing (e.g., struggling for each breath, speaks in single words)    Negative: Sounds like a life-threatening emergency to the triager    Negative: Sinus infection and taking an antibiotic    Negative: Wound infection and taking an antibiotic    Negative: Followed a genital area injury    Negative: Followed a genital area injury (penis, scrotum)    Negative: Vaginal discharge    Negative: Pus (white, yellow) or bloody discharge from end of penis    Negative: Discomfort (pain, burning or stinging) when passing urine and pregnant    Negative: Discomfort (pain, burning or stinging) when passing urine and female    Negative: Discomfort (pain, burning or stinging) when passing urine and male    Negative: Pain or itching in the vulvar area    Negative: Pain in scrotum is main symptom    Negative: Blood in the urine is main symptom    Negative: Symptoms arising from use of a urinary catheter ([place] or [place])    Negative: Unable to urinate (or only a few drops) > 4 hours and bladder feels very full (e.g., palpable bladder or strong urge to urinate)    Negative: Decreased urination and drinking very little and dehydration suspected (e.g., dark urine, no urine > 12 hours, very dry mouth, very lightheaded)    Negative: Patient sounds very sick or weak to the triager    Negative: Fever > 100.4 F (38.0 C)    Protocols used: URINARY SYMPTOMS-A-OH, INFECTION ON ANTIBIOTIC FOLLOW-UP CALL-A-OH

## 2021-08-04 ENCOUNTER — VIRTUAL VISIT (OUTPATIENT)
Dept: FAMILY MEDICINE | Facility: CLINIC | Age: 31
End: 2021-08-04
Payer: COMMERCIAL

## 2021-08-04 DIAGNOSIS — N30.00 ACUTE CYSTITIS WITHOUT HEMATURIA: Primary | ICD-10-CM

## 2021-08-04 PROCEDURE — 99213 OFFICE O/P EST LOW 20 MIN: CPT | Mod: 95 | Performed by: NURSE PRACTITIONER

## 2021-08-04 RX ORDER — SULFAMETHOXAZOLE/TRIMETHOPRIM 800-160 MG
1 TABLET ORAL 2 TIMES DAILY
Qty: 14 TABLET | Refills: 0 | Status: SHIPPED | OUTPATIENT
Start: 2021-08-04 | End: 2021-08-11

## 2021-08-04 ASSESSMENT — ENCOUNTER SYMPTOMS
FEVER: 0
HEMATURIA: 0
DIFFICULTY URINATING: 0
CHILLS: 0
DYSURIA: 1
FREQUENCY: 1

## 2021-08-04 NOTE — PROGRESS NOTES
Dax is a 30 year old who is being evaluated via a billable video visit.      How would you like to obtain your AVS? MyChart  If the video visit is dropped, the invitation should be resent by: Text to cell phone: [phone]  Will anyone else be joining your video visit? No     Video Start Time: 1:01 PM    Assessment & Plan     1. Acute cystitis without hematuria  - she already started an old prescription of Bactrim yesterday for symptoms.  Advised to complete this abx as prescribed.  Contact clinic if symptoms do not resolve and will then need urine culture.  Push fluids.   - sulfamethoxazole-trimethoprim (BACTRIM DS) 800-160 MG tablet; Take 1 tablet by mouth 2 times daily for 7 days  Dispense: 14 tablet; Refill: 0      Return in about 1 week (around 8/11/2021) for worsening symptoms or failure to improve.    RAKEL Leach CNP  [facility] [place]    Subjective   Dax is a 30 year old who presents for the following health issues     HPI     Genitourinary - Female  Onset/Duration: off and on, ongoing symptom   Description:    Painful urination (Dysuria): Burning            Frequency: YES  Blood in urine (Hematuria): no  Delay in urine (Hesitency): YES  Intensity: Starts mild and progresses   Progression of Symptoms:  worsening and intermittent  Accompanying Signs & Symptoms:  Fever/chills: no  Flank pain: no  Nausea and vomiting: no  Vaginal symptoms: none  Abdominal/Pelvic Pain: no  History:   History of frequent UTI s: YES  History of kidney stones: no  Sexually Active: YES  Possibility of pregnancy: No  Precipitating or alleviating factors: Increase in fluids   Therapies tried and outcome:  Increase in fluids      Reports she developed dysuria and frequency 2 days ago.  She started taking an old prescription of Bactrim yesterday from a UTI last month.  Denies hematuria.  Reports frequent UTI's.          Review of Systems   Constitutional: Negative for chills and fever.   Genitourinary:  Positive for dysuria and frequency. Negative for difficulty urinating, hematuria, pelvic pain and urgency.            Objective           Vitals:  No vitals were obtained today due to virtual visit.    Physical Exam   GENERAL: Healthy, alert and no distress  EYES: Eyes grossly normal to inspection.  No discharge or erythema, or obvious scleral/conjunctival abnormalities.  RESP: No audible wheeze, cough, or visible cyanosis.  No visible retractions or increased work of breathing.    SKIN: Visible skin clear. No significant rash, abnormal pigmentation or lesions.  NEURO: Cranial nerves grossly intact.  Mentation and speech appropriate for age.  PSYCH: Mentation appears normal, affect normal/bright, judgement and insight intact, normal speech and appearance well-groomed.            Video-Visit Details    Type of service:  Video Visit    Video End Time:1:08 PM    Originating Location (pt. Location): Home    Distant Location (provider location):  [facility]     Platform used for Video Visit: Appstarter

## 2021-08-12 ENCOUNTER — TELEPHONE (OUTPATIENT)
Dept: FAMILY MEDICINE | Facility: CLINIC | Age: 31
End: 2021-08-12

## 2021-08-12 DIAGNOSIS — Z30.431 INTRAUTERINE DEVICE SURVEILLANCE: Primary | ICD-10-CM

## 2021-08-12 NOTE — TELEPHONE ENCOUNTER
Pt states when was given the IUD Dr. GIGI Gandhi told her that if she had bleeding or cramping to let her know and she may need an ultrasound.      Pt states she had not had a period since getting the IUD until this month when she has bleed twice.  Now she also has abdominal cramping      Pt asking if she needs an ultrasound?  Rosy DAVIDN, RN

## 2021-08-16 ENCOUNTER — ANCILLARY PROCEDURE (OUTPATIENT)
Dept: ULTRASOUND IMAGING | Facility: CLINIC | Age: 31
End: 2021-08-16
Attending: FAMILY MEDICINE
Payer: COMMERCIAL

## 2021-08-16 DIAGNOSIS — Z30.431 INTRAUTERINE DEVICE SURVEILLANCE: ICD-10-CM

## 2021-08-16 PROCEDURE — 76830 TRANSVAGINAL US NON-OB: CPT | Performed by: RADIOLOGY

## 2021-08-16 PROCEDURE — 76856 US EXAM PELVIC COMPLETE: CPT | Performed by: RADIOLOGY

## 2021-09-12 ENCOUNTER — HEALTH MAINTENANCE LETTER (OUTPATIENT)
Age: 31
End: 2021-09-12

## 2021-09-16 ENCOUNTER — ALLIED HEALTH/NURSE VISIT (OUTPATIENT)
Dept: FAMILY MEDICINE | Facility: CLINIC | Age: 31
End: 2021-09-16
Payer: COMMERCIAL

## 2021-09-16 DIAGNOSIS — Z23 NEED FOR PROPHYLACTIC VACCINATION AND INOCULATION AGAINST INFLUENZA: Primary | ICD-10-CM

## 2021-09-16 PROCEDURE — 90471 IMMUNIZATION ADMIN: CPT

## 2021-09-16 PROCEDURE — 90686 IIV4 VACC NO PRSV 0.5 ML IM: CPT

## 2021-09-16 PROCEDURE — 99207 PR NO CHARGE NURSE ONLY: CPT

## 2021-11-02 ENCOUNTER — VIRTUAL VISIT (OUTPATIENT)
Dept: FAMILY MEDICINE | Facility: CLINIC | Age: 31
End: 2021-11-02
Payer: COMMERCIAL

## 2021-11-02 DIAGNOSIS — R43.2 LOSS OF TASTE: ICD-10-CM

## 2021-11-02 DIAGNOSIS — R43.0 LOSS OF SMELL: Primary | ICD-10-CM

## 2021-11-02 PROCEDURE — 99213 OFFICE O/P EST LOW 20 MIN: CPT | Mod: 95 | Performed by: NURSE PRACTITIONER

## 2021-11-02 ASSESSMENT — ENCOUNTER SYMPTOMS
NAUSEA: 0
DIAPHORESIS: 0
LIGHT-HEADEDNESS: 0
FEVER: 0
WHEEZING: 0
SHORTNESS OF BREATH: 0
SORE THROAT: 0
DIZZINESS: 0
EYE ITCHING: 0
SINUS PRESSURE: 0
DIARRHEA: 0
EYE DISCHARGE: 0
COUGH: 1
CHILLS: 1
FATIGUE: 0
RHINORRHEA: 0
CONSTIPATION: 0
HEADACHES: 0

## 2021-11-02 NOTE — PROGRESS NOTES
Dax is a 31 year old who is being evaluated via a billable telephone visit.      What phone number would you like to be contacted at? [phone]  How would you like to obtain your AVS? MyChart    Assessment & Plan     Loss of smell  We will set up for curbside testing.  Recommend to continue to quarantine/self isolate until received results.  Push fluids.  May take over-the-counter Tylenol as needed for discomfort.  Educated regarding warning signs to watch for and when would need to seek immediate medical attention.  - Symptomatic COVID-19 Virus (Coronavirus) by PCR; Future    Loss of taste  We will set up for curbside testing.  Recommend to continue to quarantine/self isolate until received results.  Push fluids.  May take over-the-counter Tylenol as needed for discomfort.  Educated regarding warning signs to watch for and when would need to seek immediate medical attention.  - Symptomatic COVID-19 Virus (Coronavirus) by PCR; Future    Ordering of each unique test  10 minutes spent on the date of the encounter doing chart review, history and exam, documentation and further activities per the note         No follow-ups on file.    RAKEL Martinez [facility] MARIAMA Verduzco is a 31 year old who presents for the following health issues     HPI       Concern for COVID-19  About how many days ago did these symptoms start? 7 days  Is this your first visit for this illness? Yes  In the 14 days before your symptoms started, have you had close contact with someone with COVID-19 (Coronavirus)? Yes, I have been in contact with someone who has COVID-19/Coronavirus (confirmed by lab test).  Do you have a fever or chills? Yes, I felt feverish or had chills  Are you having new or worsening difficulty breathing? No  Do you have new or worsening cough? Yes, I am coughing up mucus.  Have you had any new or unexplained body aches? No    Have you experienced any of the following NEW  symptoms?    Headache: No    Sore throat: No    Loss of taste or smell: YES    Chest pain: No    Diarrhea: No    Rash: No  What treatments have you tried? dayquil  Who do you live with? Spouse, 3 kids, sister-her 2 children  Are you, or a household member, a healthcare worker or a ? No  Do you live in a nursing home, group home, or shelter? No  Do you have a way to get food/medications if quarantined? Yes, I have a friend or family member who can help me.      Phone call completed due to COVID 19 outbreak.     Was in contact with niece with whom was dx with COVID. Symptoms started about 7 days ago. First symptom was chills, headache.  Now has a cough and burning sensation in nose. Cough that is productive. Loss of taste and smell about 5-7 days ago. Had been taking dayquil, but is not any longer. Had J&J vaccine in April. No sore throat. Wondering about getting     Review of Systems   Constitutional: Positive for chills. Negative for diaphoresis, fatigue and fever.   HENT: Negative for ear discharge, ear pain, hearing loss, rhinorrhea, sinus pressure and sore throat.         Loss of taste and smell   Eyes: Negative for discharge and itching.   Respiratory: Positive for cough (productive). Negative for shortness of breath and wheezing.    Gastrointestinal: Negative for constipation, diarrhea and nausea.   Skin: Negative for rash.   Neurological: Negative for dizziness, light-headedness and headaches.          Objective           Vitals:  No vitals were obtained today due to virtual visit.    Physical Exam   healthy, alert and no distress  PSYCH: Alert and oriented times 3; coherent speech, normal   rate and volume, able to articulate logical thoughts, able   to abstract reason, no tangential thoughts, no hallucinations   or delusions  Her affect is normal and pleasant  RESP: No cough, no audible wheezing, able to talk in full sentences  Remainder of exam unable to be completed due to telephone  visits            Phone call duration: 8 minutes

## 2021-11-04 ENCOUNTER — LAB (OUTPATIENT)
Dept: URGENT CARE | Facility: URGENT CARE | Age: 31
End: 2021-11-04
Attending: NURSE PRACTITIONER
Payer: COMMERCIAL

## 2021-11-04 DIAGNOSIS — R43.2 LOSS OF TASTE: ICD-10-CM

## 2021-11-04 DIAGNOSIS — R43.0 LOSS OF SMELL: ICD-10-CM

## 2021-11-04 PROCEDURE — U0005 INFEC AGEN DETEC AMPLI PROBE: HCPCS

## 2021-11-04 PROCEDURE — U0003 INFECTIOUS AGENT DETECTION BY NUCLEIC ACID (DNA OR RNA); SEVERE ACUTE RESPIRATORY SYNDROME CORONAVIRUS 2 (SARS-COV-2) (CORONAVIRUS DISEASE [COVID-19]), AMPLIFIED PROBE TECHNIQUE, MAKING USE OF HIGH THROUGHPUT TECHNOLOGIES AS DESCRIBED BY CMS-2020-01-R: HCPCS

## 2021-11-05 LAB — SARS-COV-2 RNA RESP QL NAA+PROBE: POSITIVE

## 2021-11-08 ENCOUNTER — TELEPHONE (OUTPATIENT)
Dept: FAMILY MEDICINE | Facility: CLINIC | Age: 31
End: 2021-11-08
Payer: COMMERCIAL

## 2021-11-08 NOTE — LETTER
"November 11, 2021      Dax Beckman  [address] [address] [address]        Dear ,    We are writing to inform you of your test results.     11/4/2021 SARS CoV2 PCR Negative Positive Abnormal     Comment: POSITIVE: SARS-CoV-2 (COVID-19) RNA detected, presumed positive.     We have tried to reach you by telephone multiple times and have been unsuccessful.     Discharge Instructions for COVID-19 Patients  You have-or may have-COVID-19. Please follow the instructions listed below.   If you have a weakened immune system, discuss with your doctor any other actions you need to take.  How can I protect others?  If you have symptoms (fever, cough, body aches or trouble breathing):    Stay home and away from others (self-isolate) until:  Your other symptoms have resolved (gotten better).   You've had no fever-and no medicine that reduces fever-for 1 full day (24 hours).   At least 10 days have passed since your symptoms started. (You may need to wait 20 days. Follow the advice of your care team.)  If you don't show symptoms, but testing showed that you have COVID-19:    Stay home and away from others (self-isolate) until at least 10 days have passed since the date of your first positive COVID-19 test.  During this time    Stay in your own room, even for meals. Use your own bathroom if you can.    Stay away from others in your home. No hugging, kissing or shaking hands. No visitors.    Don't go to work, school or anywhere else.    Clean \"high touch\" surfaces often (doorknobs, counters, handles). Use household cleaning spray or wipes.    You'll find a full list of  on the EPA website: www.epa.gov/pesticide-registration/list-n-disinfectants-use-against-sars-cov-2.    Cover your mouth and nose with a mask or other face covering to avoid spreading germs.    Wash your hands and face often. Use soap and water.    Caregivers in these groups are at risk for severe illness due to COVID-19:   People 65 years " and older   People who live in a nursing home or long-term care facility   People with chronic disease (lung, heart, cancer, diabetes, kidney, liver, immunologic)   People who have a weakened immune system, including those who:   Are in cancer treatment   Take medicine that weakens the immune system, such as corticosteroids   Had a bone marrow or organ transplant   Have an immune deficiency   Have poorly controlled HIV or AIDS   Are obese (body mass index of 40 or higher)   Smoke regularly    Caregivers should wear gloves while washing dishes, handling laundry and cleaning bedrooms and bathrooms.    Use caution when washing and drying laundry: Don't shake dirty laundry and use the warmest water setting that you can.    For more tips on managing your health at home, go to www.cdc.gov/coronavirus/2019-ncov/downloads/10Things.pdf.  How can I take care of myself at home?  1. Get lots of rest. Drink extra fluids (unless a doctor has told you not to).  2. Take Tylenol (acetaminophen) for fever or pain. If you have liver or kidney problems, ask your family doctor if it's okay to take Tylenol.   Adults can take either:    650 mg (two 325 mg pills) every 4 to 6 hours, or?  1,000 mg (two 500 mg pills) every 8 hours as needed.   Note: Don't take more than 3,000 mg in one day. Acetaminophen is found in many medicines (both prescribed and over-the-counter medicines). Read all labels to be sure you don't take too much.   For children, check the Tylenol bottle for the right dose. The dose is based on the child's age or weight.  3. If you have other health problems (like cancer, heart failure, an organ transplant or severe kidney disease): Call your specialty clinic if you don't feel better in the next 2 days.  4. Know when to call 911. Emergency warning signs include:  Trouble breathing or shortness of breath   Pain or pressure in the chest that doesn't go away   Feeling confused like you haven't felt before, or not being able to  wake up   Bluish-colored lips or face  5. Your doctor may have prescribed a blood thinner medicine. Follow their instructions.  Where can I get more information?    [facility] - About COVID-19:   [email or web address]    CDC - What to Do If You're Sick: www.cdc.gov/coronavirus/2019-ncov/about/steps-when-sick.html    CDC - Ending Home Isolation: www.cdc.gov/coronavirus/2019-ncov/hcp/disposition-in-home-patients.html    CDC - Caring for Someone: www.cdc.gov/coronavirus/2019-ncov/if-you-are-sick/care-for-someone.html    [facility] - Interim Guidance for Hospital Discharge to Home: www.[facility].[place]/diseases/coronavirus/hcp/hospdischarge.pdf    Below are the COVID-19 hotlines at the [place] Department of Health ([facility]). Interpreters are available.   For health questions: Call 484-234-2810 or 1-952.848.9460 (7 a.m. to 7 p.m.)  For questions about schools and childcare: Call 924-167-0649 or 1-670.762.3627 (7 a.m. to 7 p.m.)     For informational purposes only. Not to replace the advice of your health care provider. Clinically reviewed by Dr. Denis Leung.   Copyright   2020 [facility]. All rights reserved. [employer] 829020 - REV 01/05/21.  If you have any questions or concerns, please call the clinic at the number listed above.       Sincerely,      Anastasiya ELLINGTON CNP/hlo

## 2021-11-08 NOTE — TELEPHONE ENCOUNTER
"Result Notes     Alessandra Bell RN   11/8/2021 12:21 PM CST Back to Top        See telephone encounter     Left message on voice mail [phone] for patient to call clinic.   [phone]           Discharge Instructions for COVID-19 Patients  You have-or may have-COVID-19. Please follow the instructions listed below.   If you have a weakened immune system, discuss with your doctor any other actions you need to take.  How can I protect others?  If you have symptoms (fever, cough, body aches or trouble breathing):    Stay home and away from others (self-isolate) until:  ? Your other symptoms have resolved (gotten better). And?  ? You've had no fever-and no medicine that reduces fever-for 1 full day (24 hours). And?  ? At least 10 days have passed since your symptoms started. (You may need to wait 20 days. Follow the advice of your care team.)  If you don't show symptoms, but testing showed that you have COVID-19:    Stay home and away from others (self-isolate) until at least 10 days have passed since the date of your first positive COVID-19 test.  During this time    Stay in your own room, even for meals. Use your own bathroom if you can.    Stay away from others in your home. No hugging, kissing or shaking hands. No visitors.    Don't go to work, school or anywhere else.    Clean \"high touch\" surfaces often (doorknobs, counters, handles). Use household cleaning spray or wipes.    You'll find a full list of  on the EPA website: www.epa.gov/pesticide-registration/list-n-disinfectants-use-against-sars-cov-2.    Cover your mouth and nose with a mask or other face covering to avoid spreading germs.    Wash your hands and face often. Use soap and water.    Caregivers in these groups are at risk for severe illness due to COVID-19:  ? People 65 years and older  ? People who live in a nursing home or long-term care facility  ? People with chronic disease (lung, heart, cancer, diabetes, kidney, liver, " immunologic)  ? People who have a weakened immune system, including those who:    Are in cancer treatment    Take medicine that weakens the immune system, such as corticosteroids    Had a bone marrow or organ transplant    Have an immune deficiency    Have poorly controlled HIV or AIDS    Are obese (body mass index of 40 or higher)    Smoke regularly    Caregivers should wear gloves while washing dishes, handling laundry and cleaning bedrooms and bathrooms.    Use caution when washing and drying laundry: Don't shake dirty laundry and use the warmest water setting that you can.    For more tips on managing your health at home, go to www.cdc.gov/coronavirus/2019-ncov/downloads/10Things.pdf.  How can I take care of myself at home?  1. Get lots of rest. Drink extra fluids (unless a doctor has told you not to).  2. Take Tylenol (acetaminophen) for fever or pain. If you have liver or kidney problems, ask your family doctor if it's okay to take Tylenol.   Adults can take either:   ? 650 mg (two 325 mg pills) every 4 to 6 hours, or?  ? 1,000 mg (two 500 mg pills) every 8 hours as needed.  ? Note: Don't take more than 3,000 mg in one day. Acetaminophen is found in many medicines (both prescribed and over-the-counter medicines). Read all labels to be sure you don't take too much.   For children, check the Tylenol bottle for the right dose. The dose is based on the child's age or weight.  3. If you have other health problems (like cancer, heart failure, an organ transplant or severe kidney disease): Call your specialty clinic if you don't feel better in the next 2 days.  4. Know when to call 911. Emergency warning signs include:  ? Trouble breathing or shortness of breath  ? Pain or pressure in the chest that doesn't go away  ? Feeling confused like you haven't felt before, or not being able to wake up  ? Bluish-colored lips or face  5. Your doctor may have prescribed a blood thinner medicine. Follow their instructions.  Where  can I get more information?    [facility] - About COVID-19:   [email or web address]    CDC - What to Do If You're Sick: www.cdc.gov/coronavirus/2019-ncov/about/steps-when-sick.html    CDC - Ending Home Isolation: www.cdc.gov/coronavirus/2019-ncov/hcp/disposition-in-home-patients.html    CDC - Caring for Someone: www.cdc.gov/coronavirus/2019-ncov/if-you-are-sick/care-for-someone.html    [facility] - Interim Guidance for Hospital Discharge to Home: www.health.[facility].[place]./diseases/coronavirus/hcp/hospdischarge.pdf    Below are the COVID-19 hotlines at the [place] Department of Health ([facility]). Interpreters are available.  ? For health questions: Call 768-213-5817 or 1-253.167.6138 (7 a.m. to 7 p.m.)  ? For questions about schools and childcare: Call 815-470-2186 or 1-938.769.9265 (7 a.m. to 7 p.m.)     For informational purposes only. Not to replace the advice of your health care provider. Clinically reviewed by Dr. Denis Leung.   Copyright   2020 [facility]. All rights reserved. [employer] [id number] - REV 01/05/21.    RAKEL Cisneros CNP   11/8/2021 12:12 PM CST         Please call patient with results. Please review [facility] recommendations regarding quarantine.      Anastasiya Siddiqi NP-C      Status: Final result     Visible to patient: Yes (seen)     Dx: Loss of smell; Loss of taste    Specimen Information: Nose; Swab         3 Result Notes     Ref Range & Units 4 d ago   SARS CoV2 PCR Negative Positive Abnormal     Comment: POSITIVE: SARS-CoV-2 (COVID-19) RNA detected, presumed positive.   Resulting Agency  [facility]             Narrative  Performed by: [facility]  Testing was performed using the you SARS-CoV-2 assay on the you   6800 System. This test should be ordered for the detection of   SARS-CoV-2 in individuals who meet SARS-CoV-2 clinical and/or   epidemiological criteria. Test performance is unknown in asymptomatic   patients. This test is for in vitro diagnostic use under the  FDA EUA   for laboratories certified under CLIA to perform high and/or moderate   complexity testing. This test has not been FDA cleared or approved. A   negative result does not rule out the presence of PCR inhibitors in   the specimen or target RNA in concentration below the limit of   detection for the assay. The possibility of a false negative should   be considered if the patient's recent exposure or clinical   presentation suggests COVID-19. This test was validated by the [facility]. This   laboratory is certified under the Clinical Laboratory Improvement   Amendments of 1988 (CLIA-88) as qualified to perform high and/or   moderate complexity laboratory testing.      Specimen Collected: 11/04/21  4:03 PM Last Resulted: 11/05/21  2:47 PM

## 2021-11-10 NOTE — TELEPHONE ENCOUNTER
Called patient,  Did patient answer the phone: No, left a message on voicemail to return call to the [facility] at [phone].    Sabrina RN,BSN  Triage Nurse  [facility]: [facility]

## 2021-11-11 NOTE — TELEPHONE ENCOUNTER
3 attempts made to contact patient with results and discharge instructions for covid care.  Patient has not returned call.  Will have TC send letter with results and discharge instructions.

## 2022-02-21 ENCOUNTER — IMMUNIZATION (OUTPATIENT)
Dept: NURSING | Facility: CLINIC | Age: 32
End: 2022-02-21
Payer: COMMERCIAL

## 2022-02-21 PROCEDURE — 91306 COVID-19,PF,MODERNA (18+ YRS BOOSTER .25ML): CPT

## 2022-02-21 PROCEDURE — 0064A COVID-19,PF,MODERNA (18+ YRS BOOSTER .25ML): CPT

## 2022-07-18 NOTE — PATIENT INSTRUCTIONS
Preventive Health Recommendations  Female Ages 26 - 39  Yearly exam:   See your health care provider every year in order to  Review health changes.   Discuss preventive care.    Review your medicines if you your doctor has prescribed any.    Until age 30: Get a Pap test every three years (more often if you have had an abnormal result).    After age 30: Talk to your doctor about whether you should have a Pap test every 3 years or have a Pap test with HPV screening every 5 years.   You do not need a Pap test if your uterus was removed (hysterectomy) and you have not had cancer.  You should be tested each year for STDs (sexually transmitted diseases), if you're at risk.   Talk to your provider about how often to have your cholesterol checked.  If you are at risk for diabetes, you should have a diabetes test (fasting glucose).  Shots: Get a flu shot each year. Get a tetanus shot every 10 years.   Nutrition:   Eat at least 5 servings of fruits and vegetables each day.  Eat whole-grain bread, whole-wheat pasta and brown rice instead of white grains and rice.  Get adequate Calcium and Vitamin D.     Lifestyle  Exercise at least 150 minutes a week (30 minutes a day, 5 days of the week). This will help you control your weight and prevent disease.  Limit alcohol to one drink per day.  No smoking.   Wear sunscreen to prevent skin cancer.  See your dentist every six months for an exam and cleaning.    Trial of miralax for constipation, okay to taper dose as needed and use indefinitely.

## 2022-07-18 NOTE — PROGRESS NOTES
SUBJECTIVE:   CC: Dax Beckman is an 31 year old woman who presents for preventive health visit.       Patient has been advised of split billing requirements and indicates understanding: Yes  Healthy Habits:     Getting at least 3 servings of Calcium per day:  Yes    Bi-annual eye exam:  NO    Dental care twice a year:  Yes    Sleep apnea or symptoms of sleep apnea:  None    Diet:  Regular (no restrictions)    Frequency of exercise:  1 day/week    Duration of exercise:  Less than 15 minutes    Taking medications regularly:  Yes    Medication side effects:  None    PHQ-2 Total Score: 0    Additional concerns today:  Yes              Today's PHQ-2 Score:   PHQ-2 ( 1999 Pfizer) 7/20/2022   Q1: Little interest or pleasure in doing things 0   Q2: Feeling down, depressed or hopeless 0   PHQ-2 Score 0   PHQ-2 Total Score (12-17 Years)- Positive if 3 or more points; Administer PHQ-A if positive -   Q1: Little interest or pleasure in doing things Not at all   Q2: Feeling down, depressed or hopeless Not at all   PHQ-2 Score 0       Abuse: Current or Past (Physical, Sexual or Emotional) - No  Do you feel safe in your environment? Yes    Have you ever done Advance Care Planning? (For example, a Health Directive, POLST, or a discussion with a medical provider or your loved ones about your wishes): No, advance care planning information given to patient to review.  Patient declined advance care planning discussion at this time.    Social History     Tobacco Use     Smoking status: Never Smoker     Smokeless tobacco: Never Used   Substance Use Topics     Alcohol use: No     If you drink alcohol do you typically have >3 drinks per day or >7 drinks per week? No    Alcohol Use 7/20/2022   Prescreen: >3 drinks/day or >7 drinks/week? Not Applicable   Prescreen: >3 drinks/day or >7 drinks/week? -       Reviewed orders with patient.  Reviewed health maintenance and updated orders accordingly - Yes    G 3 P 3   Patient's last  menstrual period was 06/18/2022.     Fasting: No   Td: tdap 11/2019       Flu: 9/2021      Covid: Mod boost#1 2/21/2022, JJ 4/7/2021      Shingrix: NA      PPV: NA      Last 3 Pap and HPV Results:   PAP / HPV 10/8/2019   PAP (Historical) NIL                  Cholesterol: No results found for: CHOL  No results found for: HDL  No results found for: LDL  No results found for: TRIG  No results found for: CHOLHDLRATIO      MMG: NA  Dexa:  NA     Flex/colo: NA      Seat Belt: Yes    Sunscreen use: Yes   Calcium Intake: adeq  Health Care Directive: No  Sexually Active: Yes     Current contraception: Mirena IUD  History of abnormal Pap smear: No  Family history of colon/breast/ovarian cancer: No  Regular self breast exam: No  History of abnormal mammogram: No      Labs reviewed in EPIC  BP Readings from Last 3 Encounters:   07/20/22 120/81   07/31/20 117/73   06/16/20 110/78    Wt Readings from Last 3 Encounters:   07/20/22 91.2 kg (201 lb)   06/16/20 93.8 kg (206 lb 12.8 oz)   04/30/20 93.4 kg (206 lb)                  Patient Active Problem List   Diagnosis   (none) - all problems resolved or deleted     Past Surgical History:   Procedure Laterality Date     LASIK BILATERAL  2015       Social History     Tobacco Use     Smoking status: Never Smoker     Smokeless tobacco: Never Used   Substance Use Topics     Alcohol use: No     Family History   Problem Relation Age of Onset     Lipids Father      Heart Disease Father      Diabetes Father          Current Outpatient Medications   Medication Sig Dispense Refill     acetaminophen (TYLENOL) 325 MG tablet Take 1-2 tablets (325-650 mg) by mouth every 6 hours as needed for mild pain 100 tablet 1     levonorgestrel (MIRENA) 20 MCG/24HR IUD 1 each (20 mcg) by Intrauterine route continuous       mineral oil-hydrophilic petrolatum (AQUAPHOR) external ointment APPLY TOPICALLY TO AFFECTED AREAS DAILY AS NEEDED FOR DRY SKIN 99 g 12     polyethylene glycol (MIRALAX) 17 GM/Dose powder  "Take 17 g (1 capful) by mouth daily 850 g 3       Breast Cancer Screening:  Any new diagnosis of family breast, ovarian, or bowel cancer? No    FHS-7: No flowsheet data found.    Patient under 40 years of age: Routine Mammogram Screening not recommended.   Pertinent mammograms are reviewed under the imaging tab.      Reviewed and updated as needed this visit by clinical staff   Tobacco  Allergies  Meds  Problems  Med Hx  Surg Hx  Fam Hx  Soc   Hx          Reviewed and updated as needed this visit by Provider   Tobacco  Allergies  Meds  Problems  Med Hx  Surg Hx  Fam Hx               Review of Systems   Constitutional: Negative for chills and fever.   HENT: Negative for congestion, ear pain, hearing loss and sore throat.    Eyes: Positive for pain. Negative for visual disturbance.   Respiratory: Negative for cough and shortness of breath.    Cardiovascular: Negative for chest pain, palpitations and peripheral edema.   Gastrointestinal: Positive for constipation (once weekly bm, hard,painful and straining, present for years, has not tried miralax). Negative for abdominal pain, diarrhea, heartburn, hematochezia and nausea.   Breasts:  Negative for tenderness, breast mass and discharge.   Genitourinary: Negative for dysuria, frequency, genital sores, hematuria, pelvic pain, urgency, vaginal bleeding and vaginal discharge.   Musculoskeletal: Positive for myalgias (low back pain near SI joints. bothers her when doing activities. no radicular symptoms/red flags). Negative for arthralgias and joint swelling.   Skin: Negative for rash.   Neurological: Negative for dizziness, weakness, headaches and paresthesias.   Psychiatric/Behavioral: Positive for mood changes (anorgasmia with penetration, work on self care as it improves with getting time for herself.).          OBJECTIVE:   /81   Pulse 88   Temp 98.3  F (36.8  C) (Oral)   Resp 16   Ht 1.651 m (5' 5\")   Wt 91.2 kg (201 lb)   LMP 06/18/2022   " BMI 33.45 kg/m    Physical Exam  GENERAL: healthy, alert and no distress  EYES: Eyes grossly normal to inspection, PERRL and conjunctivae and sclerae normal  HENT: ear canals and TM's normal, nose and mouth without ulcers or lesions  NECK: no adenopathy, no asymmetry, masses, or scars and thyroid normal to palpation  RESP: lungs clear to auscultation - no rales, rhonchi or wheezes  BREAST: normal without masses, tenderness or nipple discharge and no palpable axillary masses or adenopathy  CV: regular rate and rhythm, normal S1 S2, no S3 or S4, no murmur, click or rub, no peripheral edema and peripheral pulses strong  ABDOMEN: soft, nontender, no hepatosplenomegaly, no masses and bowel sounds normal   (female): normal female external genitalia, normal urethral meatus, vaginal mucosa pink, moist, well rugated, and normal cervix/adnexa/uterus without masses or discharge, IUD strings missing  MS: no gross musculoskeletal defects noted, no edema  SKIN: no suspicious lesions or rashes  NEURO: Normal strength and tone, mentation intact and speech normal  PSYCH: mentation appears normal, affect normal/bright    Diagnostic Test Results:  Labs reviewed in Epic    ASSESSMENT/PLAN:   (Z00.00) Routine general medical examination at a health care facility  (primary encounter diagnosis)  Comment: preventive needs reviewed   Plan: Pap Screen with HPV - recommended age 30 - 65         years        see orders in Epic.     (N91.2) Amenorrhea  Comment: with Mirena though strings not visible  Plan: HCG Qual, Urine ([id number])        Check hcg    (K59.09) Other constipation  Comment: once weekly bm, hard, painful stool, requiring straining  Plan: polyethylene glycol (MIRALAX) 17 GM/Dose powder        Trial of miralix    (T83.32XA) Intrauterine contraceptive device threads lost, initial encounter  Comment: unable to find strings  Plan: US Pelvic Complete with Transvaginal        US to confirm placement, suspect strings have migrated into  "cervix          COUNSELING:  Reviewed preventive health counseling, as reflected in patient instructions  Special attention given to:        Regular exercise       Healthy diet/nutrition    Estimated body mass index is 33.45 kg/m  as calculated from the following:    Height as of this encounter: 1.651 m (5' 5\").    Weight as of this encounter: 91.2 kg (201 lb).    Weight management plan: Discussed healthy diet and exercise guidelines    She reports that she has never smoked. She has never used smokeless tobacco.      Counseling Resources:  ATP IV Guidelines  Pooled Cohorts Equation Calculator  Breast Cancer Risk Calculator  BRCA-Related Cancer Risk Assessment: FHS-7 Tool  FRAX Risk Assessment  ICSI Preventive Guidelines  Dietary Guidelines for Americans, 2010  USDA's MyPlate  ASA Prophylaxis  Lung CA Screening    Georgina Gandhi MD  [facility]  "

## 2022-07-20 ENCOUNTER — OFFICE VISIT (OUTPATIENT)
Dept: FAMILY MEDICINE | Facility: CLINIC | Age: 32
End: 2022-07-20
Payer: COMMERCIAL

## 2022-07-20 VITALS
TEMPERATURE: 98.3 F | SYSTOLIC BLOOD PRESSURE: 120 MMHG | DIASTOLIC BLOOD PRESSURE: 81 MMHG | BODY MASS INDEX: 33.49 KG/M2 | HEIGHT: 65 IN | WEIGHT: 201 LBS | RESPIRATION RATE: 16 BRPM | HEART RATE: 88 BPM

## 2022-07-20 DIAGNOSIS — N91.2 AMENORRHEA: ICD-10-CM

## 2022-07-20 DIAGNOSIS — Z00.00 ROUTINE GENERAL MEDICAL EXAMINATION AT A HEALTH CARE FACILITY: Primary | ICD-10-CM

## 2022-07-20 DIAGNOSIS — T83.32XA INTRAUTERINE CONTRACEPTIVE DEVICE THREADS LOST, INITIAL ENCOUNTER: ICD-10-CM

## 2022-07-20 DIAGNOSIS — K59.09 OTHER CONSTIPATION: ICD-10-CM

## 2022-07-20 LAB — HCG UR QL: NEGATIVE

## 2022-07-20 PROCEDURE — G0145 SCR C/V CYTO,THINLAYER,RESCR: HCPCS | Performed by: FAMILY MEDICINE

## 2022-07-20 PROCEDURE — 81025 URINE PREGNANCY TEST: CPT | Performed by: FAMILY MEDICINE

## 2022-07-20 PROCEDURE — 99395 PREV VISIT EST AGE 18-39: CPT | Performed by: FAMILY MEDICINE

## 2022-07-20 PROCEDURE — 87624 HPV HI-RISK TYP POOLED RSLT: CPT | Performed by: FAMILY MEDICINE

## 2022-07-20 RX ORDER — POLYETHYLENE GLYCOL 3350 17 G/17G
1 POWDER, FOR SOLUTION ORAL DAILY
Qty: 850 G | Refills: 3 | Status: SHIPPED | OUTPATIENT
Start: 2022-07-20 | End: 2023-07-24

## 2022-07-20 ASSESSMENT — ENCOUNTER SYMPTOMS
ABDOMINAL PAIN: 0
ARTHRALGIAS: 0
HEADACHES: 0
CONSTIPATION: 1
HEMATURIA: 0
EYE PAIN: 1
WEAKNESS: 0
SORE THROAT: 0
PALPITATIONS: 0
COUGH: 0
NAUSEA: 0
BREAST MASS: 0
DYSURIA: 0
CHILLS: 0
PARESTHESIAS: 0
SHORTNESS OF BREATH: 0
DIZZINESS: 0
MYALGIAS: 1
FEVER: 0
HEMATOCHEZIA: 0
FREQUENCY: 0
DIARRHEA: 0
JOINT SWELLING: 0
HEARTBURN: 0

## 2022-07-20 ASSESSMENT — PAIN SCALES - GENERAL: PAINLEVEL: NO PAIN (0)

## 2022-07-22 LAB
BKR LAB AP GYN ADEQUACY: NORMAL
BKR LAB AP GYN INTERPRETATION: NORMAL
BKR LAB AP HPV REFLEX: NORMAL
BKR LAB AP PREVIOUS ABNORMAL: NORMAL
PATH REPORT.COMMENTS IMP SPEC: NORMAL
PATH REPORT.COMMENTS IMP SPEC: NORMAL
PATH REPORT.RELEVANT HX SPEC: NORMAL

## 2022-07-25 ENCOUNTER — ANCILLARY PROCEDURE (OUTPATIENT)
Dept: ULTRASOUND IMAGING | Facility: CLINIC | Age: 32
End: 2022-07-25
Attending: FAMILY MEDICINE
Payer: COMMERCIAL

## 2022-07-25 DIAGNOSIS — T83.32XA INTRAUTERINE CONTRACEPTIVE DEVICE THREADS LOST, INITIAL ENCOUNTER: ICD-10-CM

## 2022-07-25 PROCEDURE — 76856 US EXAM PELVIC COMPLETE: CPT | Mod: TC | Performed by: RADIOLOGY

## 2022-07-25 PROCEDURE — 76830 TRANSVAGINAL US NON-OB: CPT | Mod: TC | Performed by: RADIOLOGY

## 2022-07-26 LAB
HUMAN PAPILLOMA VIRUS 16 DNA: NEGATIVE
HUMAN PAPILLOMA VIRUS 18 DNA: NEGATIVE
HUMAN PAPILLOMA VIRUS FINAL DIAGNOSIS: NORMAL
HUMAN PAPILLOMA VIRUS OTHER HR: NEGATIVE

## 2022-08-09 ENCOUNTER — OFFICE VISIT (OUTPATIENT)
Dept: URGENT CARE | Facility: URGENT CARE | Age: 32
End: 2022-08-09
Payer: COMMERCIAL

## 2022-08-09 VITALS
HEART RATE: 103 BPM | RESPIRATION RATE: 17 BRPM | TEMPERATURE: 102.6 F | DIASTOLIC BLOOD PRESSURE: 85 MMHG | OXYGEN SATURATION: 100 % | SYSTOLIC BLOOD PRESSURE: 131 MMHG

## 2022-08-09 DIAGNOSIS — U07.1 INFECTION DUE TO 2019 NOVEL CORONAVIRUS: Primary | ICD-10-CM

## 2022-08-09 DIAGNOSIS — R07.0 THROAT PAIN: ICD-10-CM

## 2022-08-09 LAB
DEPRECATED S PYO AG THROAT QL EIA: NEGATIVE
FLUAV AG SPEC QL IA: NEGATIVE
FLUBV AG SPEC QL IA: NEGATIVE

## 2022-08-09 PROCEDURE — 87804 INFLUENZA ASSAY W/OPTIC: CPT | Performed by: INTERNAL MEDICINE

## 2022-08-09 PROCEDURE — 87651 STREP A DNA AMP PROBE: CPT | Performed by: INTERNAL MEDICINE

## 2022-08-09 PROCEDURE — 99213 OFFICE O/P EST LOW 20 MIN: CPT | Mod: CS | Performed by: INTERNAL MEDICINE

## 2022-08-09 PROCEDURE — U0003 INFECTIOUS AGENT DETECTION BY NUCLEIC ACID (DNA OR RNA); SEVERE ACUTE RESPIRATORY SYNDROME CORONAVIRUS 2 (SARS-COV-2) (CORONAVIRUS DISEASE [COVID-19]), AMPLIFIED PROBE TECHNIQUE, MAKING USE OF HIGH THROUGHPUT TECHNOLOGIES AS DESCRIBED BY CMS-2020-01-R: HCPCS | Performed by: INTERNAL MEDICINE

## 2022-08-09 PROCEDURE — U0005 INFEC AGEN DETEC AMPLI PROBE: HCPCS | Performed by: INTERNAL MEDICINE

## 2022-08-09 RX ORDER — BENZONATATE 100 MG/1
100 CAPSULE ORAL 3 TIMES DAILY PRN
Qty: 30 CAPSULE | Refills: 0 | Status: SHIPPED | OUTPATIENT
Start: 2022-08-09 | End: 2023-07-24

## 2022-08-09 ASSESSMENT — PAIN SCALES - GENERAL: PAINLEVEL: EXTREME PAIN (8)

## 2022-08-10 LAB
GROUP A STREP BY PCR: NOT DETECTED
SARS-COV-2 RNA RESP QL NAA+PROBE: POSITIVE

## 2022-08-10 NOTE — PROGRESS NOTES
SUBJECTIVE:  Dax Beckman is an 31 year old female who presents for not feeling well.  Returned from a family gathering locally yesterday. Sister has sore throat. Yesterday started having headache and chills.  Has taken dayquil and advil.  Didn't check temp at home but here is 102.6.  Some cough, sometimes dry, sometimes congestion.  Has runny nose.  Has body aches. No v/d.  Eating some but has decreased appetite.  Drinking fluids well.  Normal urination.  No skin rashes.  Did a covid test at home which was positive.  Has a little shortness of breath and work of breathing.  Has been vaccinated for covid and also had covid last November.    PMH:  Neg.  Patient Active Problem List   Diagnosis   (none) - all problems resolved or deleted     Social History     Socioeconomic History     Marital status:      Spouse name: None     Number of children: None     Years of education: None     Highest education level: None   Tobacco Use     Smoking status: Never Smoker     Smokeless tobacco: Never Used   Vaping Use     Vaping Use: Never used   Substance and Sexual Activity     Alcohol use: No     Drug use: No     Sexual activity: Yes     Partners: Male     Family History   Problem Relation Age of Onset     Lipids Father      Heart Disease Father      Diabetes Father        ALLERGIES:  Patient has no known allergies.    Current Outpatient Medications   Medication     benzonatate (TESSALON) 100 MG capsule     levonorgestrel (MIRENA) 20 MCG/24HR IUD     acetaminophen (TYLENOL) 325 MG tablet     mineral oil-hydrophilic petrolatum (AQUAPHOR) external ointment     polyethylene glycol (MIRALAX) 17 GM/Dose powder     No current facility-administered medications for this visit.         ROS:  ROS is done and is negative for general/constitutional, eye, ENT, Respiratory, cardiovascular, GI, , Skin, musculoskeletal except as noted elsewhere.  All other review of systems negative except as noted elsewhere.      OBJECTIVE:  BP  131/85   Pulse 103   Temp (!) 102.6  F (39.2  C) (Tympanic)   Resp 17   LMP 06/18/2022   SpO2 100%   GENERAL APPEARANCE: Alert, in no acute distress, appears tired  EYES: normal  EARS: External ears normal. Canals clear. TM's normal.  NOSE:mildly inflamed mucosa  OROPHARYNX:normal  NECK:No adenopathy,masses or thyromegaly  RESP: normal and clear to auscultation  CV:regular rate and rhythm and no murmurs, clicks, or gallops  ABDOMEN: Abdomen soft, non-tender. BS normal. No masses, organomegaly  SKIN: no ulcers, lesions or rash  MUSCULOSKELETAL:Musculoskeletal normal      RESULTS  Results for orders placed or performed in visit on 08/09/22   Streptococcus A Rapid Screen w/Reflex to PCR - Clinic Collect     Status: Normal    Specimen: Throat; Swab   Result Value Ref Range    Group A Strep antigen Negative Negative   Influenza A & B Antigen - Clinic Collect     Status: Normal    Specimen: Nose; Swab   Result Value Ref Range    Influenza A antigen Negative Negative    Influenza B antigen Negative Negative    Narrative    Test results must be correlated with clinical data. If necessary, results should be confirmed by a molecular assay or viral culture.   .  No results found for this or any previous visit (from the past 48 hour(s)).    ASSESSMENT/PLAN:    ASSESSMENT / PLAN:  (U07.1) Infection due to 2019 novel coronavirus  (primary encounter diagnosis)  Comment: sxs with positive home test.  Pt not have significant risk factors for complications and has been vaccinated.  rx tessalon prn for cough  Plan: benzonatate (TESSALON) 100 MG capsule        Reviewed medication instructions and side effects. Follow up if experiences side effects.. I reviewed supportive care, otc meds to use if needed, expected course, and signs of concern.  Follow up as needed or if she does not improve within 5 day(s) or if worsens in any way.  Reviewed red flag symptoms and is to go to the ER if experiences any of these. Reviewed quarantine.  In spite of positive covid home test, pt wanted a pcr test as well as tests for strep and influenza.    (R07.0) Throat pain  Comment: neg rapid strep.  sxs c/w covid.    Plan: Streptococcus A Rapid Screen w/Reflex to PCR -         Clinic Collect, Symptomatic; Yes; 8/8/2022         COVID-19 Virus (Coronavirus) by PCR Nose,         Influenza A & B Antigen - Clinic Collect, Group        A Streptococcus PCR Throat Swab        Await strep PCR and treat if positive.  covid as above.  I reviewed supportive care, otc meds to use if needed, expected course, and signs of concern.  Follow up as needed or if she does not improve within 5 day(s) or if worsens in any way.  Reviewed red flag symptoms and is to go to the ER if experiences any of these.      See [facility] for orders, medications, letters, patient instructions    Rosio Johnson M.D.

## 2022-08-11 ENCOUNTER — TELEPHONE (OUTPATIENT)
Dept: NURSING | Facility: CLINIC | Age: 32
End: 2022-08-11

## 2022-08-11 NOTE — TELEPHONE ENCOUNTER
Coronavirus (COVID-19) Notification    Caller Name (Patient, parent, daughter/son, grandparent, etc)  Patient    Reason for call  Notify of Positive Coronavirus (COVID-19) lab results, assess symptoms,  review [facility] recommendations    Lab Result    Lab test:  2019-nCoV rRt-PCR or SARS-CoV-2 PCR    Oropharyngeal AND/OR nasopharyngeal swabs is POSITIVE for 2019-nCoV RNA/SARS-COV-2 PCR (COVID-19 virus)      Gather patient reported symptoms   Assessment   Current Symptoms at time of phone call, reported by patient: (if no symptoms, document: No symptoms] Headache, runny nose, sore throat   Date of symptom(s) onset (if applicable) 8/9     If at time of call, Patients symptoms have worsened, the Patient should contact 911 or have someone drive them to Emergency Dept promptly:      If Patient calling 911, inform 911 personal that you have tested positive for the Coronavirus (COVID-19).  Place mask on and await 911 to arrive.    If Emergency Dept, If possible, please have another adult drive you to the Emergency Dept but you need to wear mask when in contact with other people.      Treatment Options:   Patient classified as COVID treatment eligible by Epic high risk algorithm: Yes  Is the patient symptomatic at the time of result notification? Yes. Was the onset of symptoms within the last 5 days? Yes.   There are now oral medications available for the treatment of COVID-19.  Taking one of these medications within the first five days of symptoms (when people may not yet feel severely ill) has been shown to make people feel better, prevent them from getting sicker, and preventing hospitalization and death.   Does the patient agree to have a visit with a provider to discuss treatment options? No.  Reason patient declined:  Already discussed with provider and determined don't need  Discussed in ED.    Review information with Patient    Your result was positive. This means you have COVID-19 (coronavirus).    How can  I protect others?    These guidelines are for isolating before returning to work, school or .    If you DO have symptoms    Stay home and away from others     For at least 5 days after your symptoms started, AND    You are fever free for 24 hours (with no medicine that reduces fever), AND    Your other symptoms are better    Wear a mask for 10 full days anytime you are around others    If you DON'T have symptoms    Stay home and away from others for at least 5 days after your positive test    Wear a mask for 10 full days anytime you are around others    There may be different guidelines for healthcare facilities.  Please check with the specific sites before arriving.    If you have been told by a doctor that you were severely ill with COVID-19 or are immunocompromised, you should isolate for at least 10 days.    You should not go back to work until you meet the guidelines above for ending your home isolation. You don't need to be retested for COVID-19 before going back to work--studies show that you won't spread the virus if it's been at least 10 days since your symptoms started (or 20 days, if you have a weak immune system).    Employers, schools, and daycares: This is an official notice for this person's medical guidelines for returning in-person.  They must meet the above guidelines before going back to work, school or  in person.    You will receive a positive COVID-19 letter via [employer] or the mail soon with additional self-care information.    Would you like me to review some of that information with you now?  No    If you were tested for an upcoming procedure, please contact your provider for next steps.    Nelda Loya

## 2022-08-13 ENCOUNTER — MYC MEDICAL ADVICE (OUTPATIENT)
Dept: FAMILY MEDICINE | Facility: CLINIC | Age: 32
End: 2022-08-13

## 2022-08-14 ENCOUNTER — OFFICE VISIT (OUTPATIENT)
Dept: URGENT CARE | Facility: URGENT CARE | Age: 32
End: 2022-08-14
Payer: COMMERCIAL

## 2022-08-14 ENCOUNTER — MYC MEDICAL ADVICE (OUTPATIENT)
Dept: FAMILY MEDICINE | Facility: CLINIC | Age: 32
End: 2022-08-14

## 2022-08-14 VITALS
SYSTOLIC BLOOD PRESSURE: 120 MMHG | HEART RATE: 71 BPM | DIASTOLIC BLOOD PRESSURE: 84 MMHG | RESPIRATION RATE: 18 BRPM | TEMPERATURE: 98.3 F | WEIGHT: 197 LBS | OXYGEN SATURATION: 99 % | BODY MASS INDEX: 32.78 KG/M2

## 2022-08-14 DIAGNOSIS — L03.211 CELLULITIS OF FACE: Primary | ICD-10-CM

## 2022-08-14 PROCEDURE — 99213 OFFICE O/P EST LOW 20 MIN: CPT | Performed by: INTERNAL MEDICINE

## 2022-08-14 RX ORDER — SULFAMETHOXAZOLE/TRIMETHOPRIM 800-160 MG
1 TABLET ORAL 2 TIMES DAILY
Qty: 14 TABLET | Refills: 0 | Status: SHIPPED | OUTPATIENT
Start: 2022-08-14 | End: 2022-08-21

## 2022-08-14 NOTE — PROGRESS NOTES
SUBJECTIVE:  Dax Beckman is an 32 year old female who presents for redness along edge of eye.  Scratched next to right eye and got more red.  Is painful to touch.  A little non-tender swelling around eye.  Some drainage from the red irritated spot near eye, but no redness or drainag from eye itself.  No fevers.  Recently dx with covid and has improved.  No n/v. Nothing applied to affected area.  Ear on right feels full as well.    PMH:  neg  Patient Active Problem List   Diagnosis   (none) - all problems resolved or deleted     Social History     Socioeconomic History     Marital status:    Tobacco Use     Smoking status: Never Smoker     Smokeless tobacco: Never Used   Vaping Use     Vaping Use: Never used   Substance and Sexual Activity     Alcohol use: No     Drug use: No     Sexual activity: Yes     Partners: Male     Family History   Problem Relation Age of Onset     Lipids Father      Heart Disease Father      Diabetes Father        ALLERGIES:  Patient has no known allergies.    Current Outpatient Medications   Medication     acetaminophen (TYLENOL) 325 MG tablet     benzonatate (TESSALON) 100 MG capsule     levonorgestrel (MIRENA) 20 MCG/24HR IUD     polyethylene glycol (MIRALAX) 17 GM/Dose powder     mineral oil-hydrophilic petrolatum (AQUAPHOR) external ointment     No current facility-administered medications for this visit.         ROS:  ROS is done and is negative for general/constitutional, eye, ENT, Respiratory, cardiovascular, GI, , Skin, musculoskeletal except as noted elsewhere.  All other review of systems negative except as noted elsewhere.      OBJECTIVE:  /84 (BP Location: Right arm, Patient Position: Sitting, Cuff Size: Adult Large)   Pulse 71   Temp 98.3  F (36.8  C) (Tympanic)   Resp 18   Wt 89.4 kg (197 lb)   LMP 06/18/2022   SpO2 99%   BMI 32.78 kg/m    GENERAL APPEARANCE: Alert, in no acute distress  EYES: normal, EOMs intact and PERRLA  EARS: External ears  normal. Canals clear. TMs with moderate clear effusion, no erythema  NOSE:normal  OROPHARYNX:normal  NECK:No adenopathy,masses or thyromegaly  RESP: normal and clear to auscultation  CV:regular rate and rhythm and no murmurs, clicks, or gallops  ABDOMEN: Abdomen soft, non-tender. BS normal. No masses, organomegaly  SKIN: just lateral to lateral corner of right eye, there is 1cm area of erythema with small amount of thick yellowish drainage and center pustule, which is mildly tender to touch.   MUSCULOSKELETAL:Musculoskeletal normal      RESULTS  No results found for any visits on 08/14/22..  No results found for this or any previous visit (from the past 48 hour(s)).    ASSESSMENT/PLAN:    ASSESSMENT / PLAN:  (L03.211) Cellulitis of face  (primary encounter diagnosis)  Comment: after scratch near eye.  Has a little drainage so will treat with bactrim.  Plan: sulfamethoxazole-trimethoprim (BACTRIM DS)         800-160 MG tablet        Reviewed medication instructions and side effects. Follow up if experiences side effects.. I reviewed supportive care, otc meds to use if needed, expected course, and signs of concern.  Follow up as needed or if she does not improve within 5 day(s) or if worsens in any way.  Reviewed red flag symptoms and is to go to the ER if experiences any of these.      See [facility] [place] for orders, medications, letters, patient instructions    Rosio Johnson M.D.

## 2022-11-19 ENCOUNTER — HEALTH MAINTENANCE LETTER (OUTPATIENT)
Age: 32
End: 2022-11-19

## 2023-04-30 ENCOUNTER — OFFICE VISIT (OUTPATIENT)
Dept: URGENT CARE | Facility: URGENT CARE | Age: 33
End: 2023-04-30
Payer: COMMERCIAL

## 2023-04-30 ENCOUNTER — NURSE TRIAGE (OUTPATIENT)
Dept: NURSING | Facility: CLINIC | Age: 33
End: 2023-04-30

## 2023-04-30 VITALS
SYSTOLIC BLOOD PRESSURE: 129 MMHG | DIASTOLIC BLOOD PRESSURE: 80 MMHG | HEART RATE: 82 BPM | TEMPERATURE: 97.9 F | BODY MASS INDEX: 35.11 KG/M2 | OXYGEN SATURATION: 100 % | WEIGHT: 211 LBS | RESPIRATION RATE: 18 BRPM

## 2023-04-30 DIAGNOSIS — B37.31 CANDIDIASIS OF VAGINA: Primary | ICD-10-CM

## 2023-04-30 DIAGNOSIS — R30.0 DYSURIA: ICD-10-CM

## 2023-04-30 LAB
ALBUMIN UR-MCNC: NEGATIVE MG/DL
APPEARANCE UR: CLEAR
BACTERIA #/AREA URNS HPF: ABNORMAL /HPF
BILIRUB UR QL STRIP: NEGATIVE
CLUE CELLS: ABNORMAL
COLOR UR AUTO: YELLOW
GLUCOSE UR STRIP-MCNC: NEGATIVE MG/DL
HGB UR QL STRIP: ABNORMAL
KETONES UR STRIP-MCNC: NEGATIVE MG/DL
LEUKOCYTE ESTERASE UR QL STRIP: ABNORMAL
NITRATE UR QL: NEGATIVE
PH UR STRIP: 7.5 [PH] (ref 5–7)
RBC #/AREA URNS AUTO: ABNORMAL /HPF
SP GR UR STRIP: <=1.005 (ref 1–1.03)
SQUAMOUS #/AREA URNS AUTO: ABNORMAL /LPF
TRICHOMONAS, WET PREP: ABNORMAL
UROBILINOGEN UR STRIP-ACNC: 0.2 E.U./DL
WBC #/AREA URNS AUTO: ABNORMAL /HPF
WBC'S/HIGH POWER FIELD, WET PREP: ABNORMAL
YEAST, WET PREP: PRESENT

## 2023-04-30 PROCEDURE — 87086 URINE CULTURE/COLONY COUNT: CPT

## 2023-04-30 PROCEDURE — 81001 URINALYSIS AUTO W/SCOPE: CPT

## 2023-04-30 PROCEDURE — 99213 OFFICE O/P EST LOW 20 MIN: CPT

## 2023-04-30 PROCEDURE — 87210 SMEAR WET MOUNT SALINE/INK: CPT

## 2023-04-30 RX ORDER — FLUCONAZOLE 150 MG/1
150 TABLET ORAL ONCE
Qty: 1 TABLET | Refills: 0 | Status: SHIPPED | OUTPATIENT
Start: 2023-04-30 | End: 2023-04-30

## 2023-04-30 NOTE — PATIENT INSTRUCTIONS
Urine test is negative for a urinary tract infection and the wet prep test shows a yeast infection.  Take the medication as prescribed.

## 2023-04-30 NOTE — TELEPHONE ENCOUNTER
Patient was just seen at [facility] by Lizandro Rodriguez CNP and diagnosed with Candidiasis of vagina.  Medication was sent today to [facility].  Patient has questions on pharmacy.  Patient concerned about the time the pharmacy is open.  FNA advised 10:00 am and patient states that she will wait.      Reason for Disposition    Caller has medicine question only, adult not sick, AND triager answers question    Additional Information    Negative: [1] Intentional drug overdose AND [2] suicidal thoughts or ideas    Negative: MORE THAN A DOUBLE DOSE of a prescription or over-the-counter (OTC) drug    Negative: [1] DOUBLE DOSE (an extra dose or lesser amount) of prescription drug AND [2] any symptoms (e.g., dizziness, nausea, pain, sleepiness)    Negative: [1] DOUBLE DOSE (an extra dose or lesser amount) of over-the-counter (OTC) drug AND [2] any symptoms (e.g., dizziness, nausea, pain, sleepiness)    Negative: Took another person's prescription drug    Negative: [1] DOUBLE DOSE (an extra dose or lesser amount) of prescription drug AND [2] NO symptoms (Exception: a double dose of antibiotics)    Negative: Diabetes drug error or overdose (e.g., took wrong type of insulin or took extra dose)    Negative: [1] Prescription not at pharmacy AND [2] was prescribed by PCP recently (Exception: triager has access to EMR and prescription is recorded there. Go to Home Care and confirm for pharmacy.)    Negative: [1] Pharmacy calling with prescription question AND [2] triager unable to answer question    Negative: [1] Caller has URGENT medicine question about med that PCP or specialist prescribed AND [2] triager unable to answer question    Negative: Medicine patch causing local rash or itching    Negative: [1] Caller has medicine question about med NOT prescribed by PCP AND [2] triager unable to answer question (e.g., compatibility with other med, storage)    Negative: Prescription request for new  medicine (not a refill)    Negative: [1] Caller has NON-URGENT medicine question about med that PCP prescribed AND [2] triager unable to answer question    Negative: Caller wants to use a complementary or alternative medicine    Negative: [1] Prescription prescribed recently is not at pharmacy AND [2] triager has access to patient's EMR AND [3] prescription is recorded in the EMR    Negative: [1] DOUBLE DOSE (an extra dose or lesser amount) of over-the-counter (OTC) drug AND [2] NO symptoms    Negative: [1] DOUBLE DOSE (an extra dose or lesser amount) of antibiotic drug AND [2] NO symptoms    Protocols used: MEDICATION QUESTION CALL-A-

## 2023-04-30 NOTE — PROGRESS NOTES
ASSESSMENT:   (B37.31) Candidiasis of vagina  (primary encounter diagnosis)  Plan: fluconazole (DIFLUCAN) 150 MG tablet    (R30.0) Dysuria  Plan: UA Macroscopic with reflex to Microscopic and         Culture, Wet prep - lab collect, Urine         Microscopic Exam, Urine Culture      PLAN:  Informed the patient that the urine test is negative for urinary tract infection and the wet prep test shows a yeast infection.  Vaginal infection: Yeast patient instructions discussed and provided.  Instructed the patient to take the medication as prescribed.  Discussed the need to return to clinic with any new or worsening symptoms.  Patient acknowledged her understanding of the above plan.    The use of Dragon/Power[employer] dictation services may have been used to construct the content in this note; any grammatical or spelling errors are non-intentional. Please contact the author of this note directly if you are in need of any clarification.      Lizandro Rodriguez, RAKEL CNP     SUBJECTIVE:   Dax Beckman is a 32 year old female who  presents today for a possible UTI. Symptoms of dysuria, urgency, frequency and burning have been going on for 8day(s).  Hematuria no.  gradual onset and moderate.  She also reports vaginal itching and discharge that is green in color.  There is no history of fever, chills, nausea or vomiting.  This patient does have a history of urinary tract infections. Patient denies vaginal bleeding, pregnancy or STD concerns.  The patient reports first day of LMP was 3/27/23.  The patient also reports having a Mirena IUD.    ROS:   Negative except noted above.    OBJECTIVE:  GENERAL APPEARANCE: healthy, alert and no distress  RESP: lungs clear to auscultation - no rales, rhonchi or wheezes  CV: regular rates and rhythm, normal S1 S2, no murmur noted  ABDOMEN:  soft, nontender, no HSM or masses and bowel sounds normal  BACK: No CVA tenderness  SKIN: no suspicious lesions or rashes    UA: positive for leukocytes  and positive for bacturia

## 2023-05-02 LAB — BACTERIA UR CULT: NORMAL

## 2023-07-24 ENCOUNTER — TELEPHONE (OUTPATIENT)
Dept: FAMILY MEDICINE | Facility: CLINIC | Age: 33
End: 2023-07-24

## 2023-07-24 ENCOUNTER — OFFICE VISIT (OUTPATIENT)
Dept: FAMILY MEDICINE | Facility: CLINIC | Age: 33
End: 2023-07-24
Payer: COMMERCIAL

## 2023-07-24 VITALS
HEART RATE: 75 BPM | HEIGHT: 66 IN | DIASTOLIC BLOOD PRESSURE: 66 MMHG | RESPIRATION RATE: 20 BRPM | SYSTOLIC BLOOD PRESSURE: 111 MMHG | TEMPERATURE: 97.7 F | BODY MASS INDEX: 34.1 KG/M2 | WEIGHT: 212.2 LBS | OXYGEN SATURATION: 96 %

## 2023-07-24 DIAGNOSIS — F39 MOOD DISORDER (H): ICD-10-CM

## 2023-07-24 DIAGNOSIS — E66.811 CLASS 1 OBESITY DUE TO EXCESS CALORIES WITH SERIOUS COMORBIDITY AND BODY MASS INDEX (BMI) OF 34.0 TO 34.9 IN ADULT: ICD-10-CM

## 2023-07-24 DIAGNOSIS — Z01.818 TUBAL LIGATION EVALUATION: ICD-10-CM

## 2023-07-24 DIAGNOSIS — E66.811 CLASS 1 OBESITY DUE TO EXCESS CALORIES WITH SERIOUS COMORBIDITY AND BODY MASS INDEX (BMI) OF 34.0 TO 34.9 IN ADULT: Primary | ICD-10-CM

## 2023-07-24 DIAGNOSIS — Z00.00 ROUTINE GENERAL MEDICAL EXAMINATION AT A HEALTH CARE FACILITY: Primary | ICD-10-CM

## 2023-07-24 DIAGNOSIS — E66.09 CLASS 1 OBESITY DUE TO EXCESS CALORIES WITH SERIOUS COMORBIDITY AND BODY MASS INDEX (BMI) OF 34.0 TO 34.9 IN ADULT: Primary | ICD-10-CM

## 2023-07-24 DIAGNOSIS — Z13.6 CARDIOVASCULAR SCREENING; LDL GOAL LESS THAN 130: ICD-10-CM

## 2023-07-24 DIAGNOSIS — Z13.1 SCREENING FOR DIABETES MELLITUS: ICD-10-CM

## 2023-07-24 DIAGNOSIS — E66.09 CLASS 1 OBESITY DUE TO EXCESS CALORIES WITH SERIOUS COMORBIDITY AND BODY MASS INDEX (BMI) OF 34.0 TO 34.9 IN ADULT: ICD-10-CM

## 2023-07-24 DIAGNOSIS — R63.5 WEIGHT GAIN: ICD-10-CM

## 2023-07-24 DIAGNOSIS — K59.09 OTHER CONSTIPATION: ICD-10-CM

## 2023-07-24 PROCEDURE — 82947 ASSAY GLUCOSE BLOOD QUANT: CPT | Performed by: FAMILY MEDICINE

## 2023-07-24 PROCEDURE — 84443 ASSAY THYROID STIM HORMONE: CPT | Performed by: FAMILY MEDICINE

## 2023-07-24 PROCEDURE — 36415 COLL VENOUS BLD VENIPUNCTURE: CPT | Performed by: FAMILY MEDICINE

## 2023-07-24 PROCEDURE — 80061 LIPID PANEL: CPT | Performed by: FAMILY MEDICINE

## 2023-07-24 PROCEDURE — 99214 OFFICE O/P EST MOD 30 MIN: CPT | Mod: 25 | Performed by: FAMILY MEDICINE

## 2023-07-24 PROCEDURE — 99395 PREV VISIT EST AGE 18-39: CPT | Performed by: FAMILY MEDICINE

## 2023-07-24 RX ORDER — POLYETHYLENE GLYCOL 3350 17 G/17G
17 POWDER, FOR SOLUTION ORAL DAILY
Qty: 850 G | Refills: 3 | Status: SHIPPED | OUTPATIENT
Start: 2023-07-24 | End: 2023-11-07

## 2023-07-24 RX ORDER — POLYETHYLENE GLYCOL 3350 17 G/17G
17 POWDER, FOR SOLUTION ORAL DAILY
Qty: 850 G | Refills: 3 | Status: SHIPPED | OUTPATIENT
Start: 2023-07-24 | End: 2023-07-24

## 2023-07-24 ASSESSMENT — PAIN SCALES - GENERAL: PAINLEVEL: NO PAIN (0)

## 2023-07-24 NOTE — PROGRESS NOTES
SUBJECTIVE:   CC: Dax is an 32 year old who presents for preventive health visit.       7/24/2023    11:00 AM   Additional Questions   Roomed by Eileen HIDALGO   Accompanied by self     Healthy Habits:     Getting at least 3 servings of Calcium per day:  Yes    Bi-annual eye exam:  NO    Dental care twice a year:  NO    Sleep apnea or symptoms of sleep apnea:  None    Diet:  Regular (no restrictions)    Frequency of exercise:  2-3 days/week    Duration of exercise:  15-30 minutes    Taking medications regularly:  Yes    Medication side effects:  None    Additional concerns today:  Yes      Today's PHQ-2 Score:       7/24/2023    10:58 AM   PHQ-2 ( 1999 [employer])   Q1: Little interest or pleasure in doing things 0   Q2: Feeling down, depressed or hopeless 0   PHQ-2 Score 0   Q1: Little interest or pleasure in doing things Not at all   Q2: Feeling down, depressed or hopeless Not at all   PHQ-2 Score 0     Weight discussion     Social History     Tobacco Use    Smoking status: Never    Smokeless tobacco: Never   Substance Use Topics    Alcohol use: No             7/24/2023    10:58 AM   Alcohol Use   Prescreen: >3 drinks/day or >7 drinks/week? No     Reviewed orders with patient.  Reviewed health maintenance and updated orders accordingly - Yes    Weight gain is a concern  has worked at calorie restriction and exercise.  No other meds tried.    G 3 P 3   Patient's last menstrual period was 06/25/2023.     Fasting: Yes    Td:Tdap 11/2019       Flu: 9/2021      Covid: jj 4/7/2021, mono boost 2/21/2022      Shingrix: NA      PPV: NA      Last 3 Pap and HPV Results:       Latest Ref Rng & Units 7/20/2022     3:03 PM 10/8/2019    11:48 AM   PAP / HPV   PAP  Negative for Intraepithelial Lesion or Malignancy (NILM)     PAP (Historical)   NIL    HPV 16 DNA Negative Negative     HPV 18 DNA Negative Negative     Other HR HPV Negative Negative                    Cholesterol: No results found for: CHOL  No results found for: HDL  No  results found for: LDL  No results found for: TRIG  No results found for: CHOLHDLRATIO      MMG: NA  Dexa:  NA     Flex/colo: NA      Seat Belt: Yes    Sunscreen use: Yes   Calcium Intake: adeq  Health Care Directive: No  Sexually Active: Yes     Current contraception: Mirena IUD  History of abnormal Pap smear: No  Family history of colon/breast/ovarian cancer: No  Regular self breast exam: No  History of abnormal mammogram: No      Labs reviewed in EPIC  BP Readings from Last 3 Encounters:   23 111/66   23 129/80   22 120/84    Wt Readings from Last 3 Encounters:   23 96.3 kg (212 lb 3.2 oz)   23 95.7 kg (211 lb)   22 89.4 kg (197 lb)                  Patient Active Problem List   Diagnosis    Mood disorder (H)     Past Surgical History:   Procedure Laterality Date    LASIK BILATERAL         Social History     Tobacco Use    Smoking status: Never    Smokeless tobacco: Never   Substance Use Topics    Alcohol use: No     Family History   Problem Relation Age of Onset    Lipids Father     Heart Disease Father     Diabetes Father          Current Outpatient Medications   Medication Sig Dispense Refill    acetaminophen (TYLENOL) 325 MG tablet Take 1-2 tablets (325-650 mg) by mouth every 6 hours as needed for mild pain 100 tablet 1    levonorgestrel (MIRENA) 20 MCG/24HR IUD 1 each (20 mcg) by Intrauterine route continuous      polyethylene glycol (MIRALAX) 17 GM/Dose powder Take 17 g by mouth daily 850 g 3    Semaglutide-Weight Management (WEGOVY) 0.25 MG/0.5ML pen Inject 0.25 mg Subcutaneous once a week 2 mL 0       Breast Cancer Screenin/20/2022     2:11 PM   Breast CA Risk Assessment (FHS-7)   Do you have a family history of breast, colon, or ovarian cancer? No / Unknown         Patient under 40 years of age: Routine Mammogram Screening not recommended.   Pertinent mammograms are reviewed under the imaging tab.        Reviewed and updated as needed this visit by  "clinical staff   Tobacco  Allergies  Meds  Problems  Med Hx  Surg Hx  Fam Hx          Reviewed and updated as needed this visit by Provider   Tobacco  Allergies  Meds  Problems  Med Hx  Surg Hx  Fam Hx             Review of Systems  CONSTITUTIONAL: NEGATIVE for fever, chills, change in weight  INTEGUMENTARU/SKIN: NEGATIVE for worrisome rashes, moles or lesions  EYES: NEGATIVE for vision changes or irritation  ENT: NEGATIVE for ear, mouth and throat problems  RESP: NEGATIVE for significant cough or SOB  BREAST: NEGATIVE for masses, tenderness or discharge  CV: NEGATIVE for chest pain, palpitations or peripheral edema  GI: NEGATIVE for nausea, abdominal pain, heartburn, or change in bowel habits  : NEGATIVE for unusual urinary or vaginal symptoms. Periods are regular.  MUSCULOSKELETAL: NEGATIVE for significant arthralgias or myalgia  NEURO: NEGATIVE for weakness, dizziness or paresthesias  PSYCHIATRIC: NEGATIVE for changes in mood or affect     OBJECTIVE:   /66   Pulse 75   Temp 97.7  F (36.5  C) (Oral)   Resp 20   Ht 1.664 m (5' 5.5\")   Wt 96.3 kg (212 lb 3.2 oz)   LMP 06/25/2023   SpO2 96%   Breastfeeding No   BMI 34.77 kg/m    Physical Exam  GENERAL: healthy, alert and no distress  EYES: Eyes grossly normal to inspection, PERRL and conjunctivae and sclerae normal  HENT: ear canals and TM's normal, nose and mouth without ulcers or lesions  NECK: no adenopathy, no asymmetry, masses, or scars and thyroid normal to palpation  RESP: lungs clear to auscultation - no rales, rhonchi or wheezes  BREAST: normal without masses, tenderness or nipple discharge and no palpable axillary masses or adenopathy  CV: regular rate and rhythm, normal S1 S2, no S3 or S4, no murmur, click or rub, no peripheral edema and peripheral pulses strong  ABDOMEN: soft, nontender, no hepatosplenomegaly, no masses and bowel sounds normal  MS: no gross musculoskeletal defects noted, no edema  SKIN: no suspicious lesions " "or rashes  NEURO: Normal strength and tone, mentation intact and speech normal  PSYCH: mentation appears normal, affect normal/bright    Diagnostic Test Results:  Labs reviewed in Epic    ASSESSMENT/PLAN:   (Z00.00) Routine general medical examination at a health care facility  (primary encounter diagnosis)  Comment: preventive needs reviewed   Plan: see orders in Epic.     (E66.09,  Z68.34) Class 1 obesity due to excess calories with serious comorbidity and body mass index (BMI) of 34.0 to 34.9 in adult  Comment: trending up, no other medical attempts, has done calorie restriction and exercise without success  Plan: Adult Comprehensive Weight Management         Referral, Semaglutide-Weight Management         (WEGOVY) 0.25 MG/0.5ML pen, DISCONTINUED:         Semaglutide-Weight Management (WEGOVY) 0.25         MG/0.5ML pen        Wegovy reviewed, pt desires to try it    (R63.5) Weight gain  Comment: r/o physiologic cause  Plan: TSH with free T4 reflex            (Z01.818) Tubal ligation evaluation  Comment: desires permanent birth control  Plan: Ob/Gyn Referral            (F39) Mood disorder (H)  Comment: worsening mood due to weight  Plan: declines meds at this time    (Z13.6) CARDIOVASCULAR SCREENING; LDL GOAL LESS THAN 130  Comment: due  Plan: Lipid panel reflex to direct LDL Fasting            (Z13.1) Screening for diabetes mellitus  Comment: due  Plan: Glucose            (K59.09) Other constipation  Comment: controlled if on miralax  Plan: polyethylene glycol (MIRALAX) 17 GM/Dose         powder, DISCONTINUED: polyethylene glycol         (MIRALAX) 17 GM/Dose powder        Refill x 1 yr           COUNSELING:  Reviewed preventive health counseling, as reflected in patient instructions  Special attention given to:        Regular exercise       Healthy diet/nutrition      BMI:   Estimated body mass index is 34.77 kg/m  as calculated from the following:    Height as of this encounter: 1.664 m (5' 5.5\").    " Weight as of this encounter: 96.3 kg (212 lb 3.2 oz).   Weight management plan: Wegovy ordered      She reports that she has never smoked. She has never used smokeless tobacco.          Georgina Gandhi MD  [facility]

## 2023-07-24 NOTE — LETTER
"2023    To: [place]    RE: Dax Beckman  [address] [facility] [address]  : 1990  MRN: [id number]    To Whom It May Concern,    I am writing on behalf of my patient, Dax Beckman to document the medical necessity of Wegovy for the treatment of obesity. This letter provides information about the patient's medical history and diagnosis and a statement summarizing my treatment rationale.     Summary of Patient History and Diagnosis  I had the pleasure of seeing your patient, Dax Beckman. Full intake/assessment was done to determine barriers to weight loss success and develop a treatment plan. Dax Beckman is a 33 year old female interested in treatment of medical problems associated with excess weight. She has a height of 5' 5.5\", a weight of 215 lbs 8 oz, and the calculated Body mass index is 35.32 kg/m .      Overweight onset in gradeschool. Increase weight through 3 pregnancies starting at 19yo. Was able to lose weight after 1st pregnancy to 175lbs. Previously live in [place] for 10 years, upon return to the  3 years ago gained weight. Has tried to lose weight through increase exercise, keto, and intermittent fasting with minimal weight loss. Significant family hx of obesity. Wants to lose weight to feel more comfortable overall.      Some increase hunger, but tries very hard not to give into it. Some thoughts of food. Minimal cravings of salty. Can get full, and stay full. No emotional eating. Some boredom eating.          Treatment Rationale      Patient tried Phentermine 15mg and experienced side effects of irritability and stomach upset. She did not tolerate this medication and is therefore contraindicated.       Duration  Up to 12 months.       Summary  In summary, Wegovy is medically necessary for this patient s medical condition. Please call my office at [phone] if I can provide you with any additional information to approve my request. I look forward to receiving your " timely response and approval of this request.     Sincerely,    Opal Callaway PA-C

## 2023-07-24 NOTE — TELEPHONE ENCOUNTER
Prior Authorization Retail Medication Request    Medication/Dose: Wegovy 0.25 mg/0.5 mL  ICD code (if different than what is on RX):  Class 1 obesity due to excess calories with serious comorbidity and body mass index (BMI) of 34.0 to 34.9 in adult [E66.09, Z68.34]   Previously Tried and Failed:    Rationale:      Insurance Name:  [facility]  Insurance ID:  [id number]     Pharmacy Information (if different than what is on RX)  Name:  [facility]  Phone:  [phone]    [place]:  [id number]

## 2023-07-24 NOTE — TELEPHONE ENCOUNTER
Pt came to  would like medications discussed from today sent to [facility] location instead of here at [facility]. Pt gave address of [address]    Thanks!   Katie Barajas

## 2023-07-25 PROBLEM — E66.09 CLASS 1 OBESITY DUE TO EXCESS CALORIES WITH SERIOUS COMORBIDITY AND BODY MASS INDEX (BMI) OF 34.0 TO 34.9 IN ADULT: Status: ACTIVE | Noted: 2023-07-25

## 2023-07-25 PROBLEM — E66.811 CLASS 1 OBESITY DUE TO EXCESS CALORIES WITH SERIOUS COMORBIDITY AND BODY MASS INDEX (BMI) OF 34.0 TO 34.9 IN ADULT: Status: ACTIVE | Noted: 2023-07-25

## 2023-07-25 LAB
CHOLEST SERPL-MCNC: 126 MG/DL
FASTING STATUS PATIENT QL REPORTED: YES
GLUCOSE SERPL-MCNC: 92 MG/DL (ref 70–99)
HDLC SERPL-MCNC: 38 MG/DL
LDLC SERPL CALC-MCNC: 75 MG/DL
NONHDLC SERPL-MCNC: 88 MG/DL
TRIGL SERPL-MCNC: 64 MG/DL
TSH SERPL DL<=0.005 MIU/L-ACNC: 2.89 UIU/ML (ref 0.3–4.2)

## 2023-07-25 NOTE — TELEPHONE ENCOUNTER
Central Prior Authorization Team   Phone: [phone]      PRIOR AUTHORIZATION DENIED    Medication: WEGOVY 0.25 MG/0.5ML SC SOAJ  Insurance Company: [employer] - Phone [phone] Fax [phone]  Denial Date: 7/24/2023  Denial Rational: MUST TRY/FAIL PHENTERMINE FOR AT LEAST 12 WEEKS IN COMBINATION WITH LIFESTYLE MODIFICATIONS      Appeal Information: IF PROVIDER WOULD LIKE TO APPEAL THIS DECISION PLEASE PROVIDE PA TEAM WITH LETTER OF MEDICAL NECESSITY      Patient Notified: No

## 2023-07-25 NOTE — TELEPHONE ENCOUNTER
Please notify pt that she is required to try phentermine first for at least 12 weeks.  I do not prescribe this medication and the weight management referral I placed during her visit will likely offer the med.  She should schedule the next available appt with them.

## 2023-08-14 ENCOUNTER — LAB (OUTPATIENT)
Dept: LAB | Facility: CLINIC | Age: 33
End: 2023-08-14
Payer: COMMERCIAL

## 2023-08-14 ENCOUNTER — OFFICE VISIT (OUTPATIENT)
Dept: ENDOCRINOLOGY | Facility: CLINIC | Age: 33
End: 2023-08-14
Payer: COMMERCIAL

## 2023-08-14 VITALS
SYSTOLIC BLOOD PRESSURE: 109 MMHG | BODY MASS INDEX: 34.63 KG/M2 | OXYGEN SATURATION: 99 % | HEART RATE: 64 BPM | HEIGHT: 66 IN | WEIGHT: 215.5 LBS | DIASTOLIC BLOOD PRESSURE: 76 MMHG

## 2023-08-14 DIAGNOSIS — E66.811 CLASS 1 OBESITY DUE TO EXCESS CALORIES WITH SERIOUS COMORBIDITY AND BODY MASS INDEX (BMI) OF 34.0 TO 34.9 IN ADULT: ICD-10-CM

## 2023-08-14 DIAGNOSIS — E66.09 CLASS 1 OBESITY DUE TO EXCESS CALORIES WITH SERIOUS COMORBIDITY AND BODY MASS INDEX (BMI) OF 34.0 TO 34.9 IN ADULT: ICD-10-CM

## 2023-08-14 LAB
ALBUMIN SERPL BCG-MCNC: 4.3 G/DL (ref 3.5–5.2)
ALP SERPL-CCNC: 76 U/L (ref 35–104)
ALT SERPL W P-5'-P-CCNC: 7 U/L (ref 0–50)
ANION GAP SERPL CALCULATED.3IONS-SCNC: 7 MMOL/L (ref 7–15)
AST SERPL W P-5'-P-CCNC: 14 U/L (ref 0–45)
BILIRUB SERPL-MCNC: 0.5 MG/DL
BUN SERPL-MCNC: 9.7 MG/DL (ref 6–20)
CALCIUM SERPL-MCNC: 9.2 MG/DL (ref 8.6–10)
CHLORIDE SERPL-SCNC: 104 MMOL/L (ref 98–107)
CREAT SERPL-MCNC: 0.65 MG/DL (ref 0.51–0.95)
DEPRECATED HCO3 PLAS-SCNC: 28 MMOL/L (ref 22–29)
ERYTHROCYTE [DISTWIDTH] IN BLOOD BY AUTOMATED COUNT: 13 % (ref 10–15)
GFR SERPL CREATININE-BSD FRML MDRD: >90 ML/MIN/1.73M2
GLUCOSE SERPL-MCNC: 93 MG/DL (ref 70–99)
HBA1C MFR BLD: 5.7 %
HCT VFR BLD AUTO: 40.7 % (ref 35–47)
HGB BLD-MCNC: 13.4 G/DL (ref 11.7–15.7)
MCH RBC QN AUTO: 28.6 PG (ref 26.5–33)
MCHC RBC AUTO-ENTMCNC: 32.9 G/DL (ref 31.5–36.5)
MCV RBC AUTO: 87 FL (ref 78–100)
PLATELET # BLD AUTO: 279 10E3/UL (ref 150–450)
POTASSIUM SERPL-SCNC: 4.6 MMOL/L (ref 3.4–5.3)
PROT SERPL-MCNC: 7.2 G/DL (ref 6.4–8.3)
PTH-INTACT SERPL-MCNC: 64 PG/ML (ref 15–65)
RBC # BLD AUTO: 4.69 10E6/UL (ref 3.8–5.2)
SODIUM SERPL-SCNC: 139 MMOL/L (ref 136–145)
WBC # BLD AUTO: 7.1 10E3/UL (ref 4–11)

## 2023-08-14 PROCEDURE — 83036 HEMOGLOBIN GLYCOSYLATED A1C: CPT

## 2023-08-14 PROCEDURE — 83970 ASSAY OF PARATHORMONE: CPT | Performed by: PATHOLOGY

## 2023-08-14 PROCEDURE — 99000 SPECIMEN HANDLING OFFICE-LAB: CPT | Performed by: PATHOLOGY

## 2023-08-14 PROCEDURE — 99204 OFFICE O/P NEW MOD 45 MIN: CPT

## 2023-08-14 PROCEDURE — 80053 COMPREHEN METABOLIC PANEL: CPT | Performed by: PATHOLOGY

## 2023-08-14 PROCEDURE — 36415 COLL VENOUS BLD VENIPUNCTURE: CPT | Performed by: PATHOLOGY

## 2023-08-14 PROCEDURE — 82306 VITAMIN D 25 HYDROXY: CPT

## 2023-08-14 PROCEDURE — 85027 COMPLETE CBC AUTOMATED: CPT | Performed by: PATHOLOGY

## 2023-08-14 RX ORDER — PHENTERMINE HYDROCHLORIDE 15 MG/1
15 CAPSULE ORAL EVERY MORNING
Qty: 30 CAPSULE | Refills: 3 | Status: SHIPPED | OUTPATIENT
Start: 2023-08-14 | End: 2023-11-07 | Stop reason: SINTOL

## 2023-08-14 ASSESSMENT — PAIN SCALES - GENERAL: PAINLEVEL: MILD PAIN (3)

## 2023-08-14 NOTE — NURSING NOTE
"(   Chief Complaint   Patient presents with    New Patient     New MWM, BMI of 34.8    )    ( Weight: 97.8 kg (215 lb 8 oz) )  ( Height: 166.4 cm (5' 5.5\") )  ( BMI (Calculated): 35.32 )  (   )  (   )  (   )  (   )  (   )  (   )    ( BP: 109/76 )  (   )  (   )  (   )  ( Pulse: 64 )  (   )  ( SpO2: 99 % )    (   Patient Active Problem List   Diagnosis    Mood disorder (H)    Class 1 obesity due to excess calories with serious comorbidity and body mass index (BMI) of 34.0 to 34.9 in adult    )  (   Current Outpatient Medications   Medication Sig Dispense Refill    acetaminophen (TYLENOL) 325 MG tablet Take 1-2 tablets (325-650 mg) by mouth every 6 hours as needed for mild pain 100 tablet 1    levonorgestrel (MIRENA) 20 MCG/24HR IUD 1 each (20 mcg) by Intrauterine route continuous      polyethylene glycol (MIRALAX) 17 GM/Dose powder Take 17 g by mouth daily 850 g 3    Semaglutide-Weight Management (WEGOVY) 0.25 MG/0.5ML pen Inject 0.25 mg Subcutaneous once a week (Patient not taking: Reported on 8/14/2023) 2 mL 0    )  ( Diabetes Eval:    )    ( Pain Eval:  Mild Pain (3) )    ( Wound Eval:       )    (   History   Smoking Status    Never   Smokeless Tobacco    Never    )    ( Signed By:  Damian Carmona, EMT; August 14, 2023; 7:00 AM )    "

## 2023-08-14 NOTE — PATIENT INSTRUCTIONS
"Maciel Verduzco, it was nice to meet you today!  Thank you for allowing us the privilege of caring for you. We hope we provided you with the excellent service you deserve.   Please let us know if there is anything else we can do for you so that we can be sure you are completely satisfied with your care experience.    To ensure the quality of our services you may be receiving a patient satisfaction survey from an independent patient satisfaction monitoring company.    The greatest compliment you can give is a \"Likely to Recommend\"    Your visit was with SULEMA BARAJAS PA-C today.    Instructions per today's visit:     Start Phentermine 15mg - take 1 tablet in the morning. Check BP in 1-2 weeks  Labs ordered   Follow up with Sulema Esparza in 2-3 months. Will reach out via [employer] in 1 month   Dietitian as needed.     ___________________________________________________________________________  Important contact and scheduling information:  Please call our contact center at [phone] to schedule your next appointments.  For any nursing questions or concerns call Rosa Brown LPN at [phone] or Rosio Duran RN at [phone]  Please call during clinic hours Monday through Friday 8:00a - 4:00p if you have questions or you can contact us via [employer] at anytime and we will reply during clinic hours.    Lab results will be communicated through My Chart or letter (if My Chart not used). Please call the clinic if you have not received communication after 1 week or if you have any questions.?  Clinic Fax: [phone]  __________________________________________________________________________    If labs were ordered today:    Please make an appointment to have them drawn at your convenience.     To schedule the Lab Appointment using [employer]:  Select \"Schedule an Appointment\"  Select \"Lab Only\"  For \"A couple of questions\", select \"Other\"  For \"Which locations work for you?, select the location and set up the appointment    To " schedule by phone call [phone] to schedule a lab only appointment at any [facility] lab.  ___________________________________________________________________________  Work with A Health !  Virtual Sessions are Available through [facility] Weight Management Clinics    To learn more, call to schedule a free, Health  Q&A appointment: [phone]     What is Health Coaching?  Do you know what you are supposed to do, but you just aren't doing it?  Then, HEALTH COACHING may help you!   Get unstuck and move forward with the support of a professionally trained NBC-HWC (National Board-Certified Health and ) who uses evidence-based approaches to help you move forward with healthy lifestyle changes in the areas of weight loss, stress management and overall well-being.    Health Coaches help you identify goals that will work best for you. Health Coaches provide support and encouragement with overcoming barriers and help you to find inspiration and motivation to lead a healthy lifestyle.    Option one:  Health Coaching 3-Pack; Three, 30-minute Health Coaching Visits, for $99  Visits are done virtually (phone or video)  This is a self pay service; we do not accept insurance for isidro coaching.    Option two:   The 24 week Plan; 11 Health Coaching Visits, and a 7 months subscription to [employer]-- on-demand fitness, nutrition and mindfulness classes, for $499 (employee discounts may be available). Participants will also meet regularly with a weight management Medical Provider and a Registered/Licensed Dietician.  This is a self-pay service; we do not accept insurance for health coaching.    To Schedule a free Health  Q&A appointment to learn more,  call [phone].  ____________________________________________________________________    [facility]   Healthy Lifestyle Virtual Support Group    Healthy Lifestyle Virtual Support  Group?  This is 60 minutes of small group guided discussion, support and resources. All are welcome who want a healthy lifestyle.  WHEN: Starting in July 2023, this group meets the 1st Friday of the month from 12:30 PM - 1:30 PM virtually using Microsoft Teams.    FACILITATOR: Led by National Board Certified Health and , Destinee Guajardo [facility].   TO REGISTER: Please send an email to Destinee [email or web address] to receive monthly invites to the group or if you have any questions about having a health .  Prior to the meeting, a link with directions on how to join the meeting will be sent to you.    2023 Meetings  May 19: Let's Talk  June 9: Create Your Coaching Toolkit: Learn How to  Yourself  July 7: Let's Talk  August 4: Benefits of Fiber with MARY ANN Gloria  September 1: Show and Tell (share your aps, podcasts, recipes, hacks, books)  October 6 :Let's Talk  November 3: Introduction to Mindfulness   December 1: Let's Talk    If you would like bariatric surgery specific support group info please let your care team know.         Thank you,   [facility] Comprehensive Weight Management Team    PHENTERMINE    We are considering starting Phentermine. Take one tablet in the morning.      Phentermine is being prescribed because you identified hunger as one of the main causes for your extra weight.      Our patients on Phentermine find that they:    >feel less hunger    >find it easier to push the plate away   >have an easier time eating less    For some of our patients, these feelings are very real and immediate. For other patients, the feelings are less obvious. They don't feel much of a change but find they've lost weight. Like all weight loss medications, Phentermine  works best when you help it work. This means:  1. Having less tempting high calorie (fattening) food around the house or office. (For people with strong cravings this is very important.)   2. Staying away from situations or  people that may trigger your cravings .   3. Eating out only one time or less each week.  4. Eating your meals at a table with the TV or computer off.    Side-effects. Phentermine is generally well tolerated. The main side-effects we see are feelings of racing pulse or rapid heart beat. Some people can get an elevated blood pressure. Because of this we may have you come back within a week or so of starting the medication for a blood pressure check.        For any questions or concerns please send a [employer] message to our team or call our weight management call center at [phone] during regular business hours. For questions during evenings or weekends your messages will be addressed during the next business day.  For emergencies please call 911 or seek immediate medical care.      In order to get refills of this or any medication we prescribe you must be seen in the medical weight mgmt clinic every 2-3 months. Please have your pharmacy fax a refill request to [phone].

## 2023-08-14 NOTE — LETTER
"2023       RE: Dax Beckman  [id number] [facility] [address]     Dear Colleague,    Thank you for referring your patient, Dax Beckman, to the [facility] WEIGHT MANAGEMENT CLINIC [facility]. Please see a copy of my visit note below.    55 minutes spent by me on the date of the encounter doing chart review, history and exam, documentation and further activities per the note    New Medical Weight Management Consult    PATIENT:  Dax Beckman  MRN:         [id number]  :         1990  TONY:         2023    Dear Dr. Georgina Gandhi,    I had the pleasure of seeing your patient, Dax Beckman. Full intake/assessment was done to determine barriers to weight loss success and develop a treatment plan. Dax Beckman is a 33 year old female interested in treatment of medical problems associated with excess weight. She has a height of 5' 5.5\", a weight of 215 lbs 8 oz, and the calculated Body mass index is 35.32 kg/m .        Assessment & Plan  Problem List Items Addressed This Visit          Digestive    Class 1 obesity due to excess calories with serious comorbidity and body mass index (BMI) of 34.0 to 34.9 in adult     Overweight onset in gradeschool. Increase weight through 3 pregnancies starting at 19yo. Was able to lose weight after 1st pregnancy to 175lbs. Previously live in [place] for 10 years, [place] the [place] 3 [place] ago gained weight. Has tried to lose weight through increase exercise, keto, and intermittent fasting with minimal weight loss. Significant family hx of obesity. Wants to lose weight to feel more comfortable overall.     Some increase hunger, but tries very hard not to give into it. Some thoughts of food. Minimal cravings of salty. Can get full, and stay full. No emotional eating. Some boredom eating.     Discussed AOMs to help with weight loss:   - Topiramate - can consider in the future. No " "contraindications. No hx of kidney stones or disease. Has IUD   - Naltrexone - can consider in the future. No contraindications. No hx of liver disease. Not taking opioids.   - GLP-1 - Wegovy prescribed by PCP. Was denied, needs to trial Phentermine for 12 weeks. Can appeal after phentermine trial. No contraindications. Discussed side effects, risks, and benefits.  - Phentermine - to be started today. No contraindications. Discussed side effects, risks, and benefits. No hx of HTN or anxiety. Will check BP in 1-2 weeks.            Relevant Medications    phentermine (ADIPEX-P) 15 MG capsule    Other Relevant Orders    CBC with platelets (Completed)    Comprehensive metabolic panel (Completed)    Hemoglobin A1c (Completed)    Parathyroid Hormone Intact (Completed)    Vitamin D Deficiency        Start Phentermine 15mg - take 1 tablet in the morning. Check BP in 1-2 weeks  Labs ordered   Follow up with Opal Esparza in 2-3 months. Will reach out via [employer] in 1 month   Dietitian as needed.           Dax Beckman is a 33 year old female who presents to clinic today for the following health issues.     She has the following co-morbidities:        8/11/2023     9:06 AM   --   I have the following health issues associated with obesity None of the above   I have the following symptoms associated with obesity Depression    Back Pain    Fatigue    Irregular Menstral Cycle     Depression - well controlled without medications. Follow by PCP     Constipation - goes every 3 days. Stools are hard. Started Miralax with relief.         8/11/2023     9:06 AM   Referring Provider   Please name the provider who referred you to Medical Weight Management  If you do not know, please answer \"I Don't Know\" Dr. Georgina Gandhi           8/11/2023     9:06 AM   Weight History   How concerned are you about your weight? Very Concerned   I became overweight As a Teenager   The following factors have contributed to my weight gain Mental Health " Issues    Stress    Other   Please list the other factors IUD   I have tried the following methods to lose weight Watching Portions or Calories    Exercise    Fasting   My lowest weight since age 19 was 175   My highest weight since age 19 was 225   The most weight I have ever lost was (lbs) 10   I have the following family history of obesity/being overweight One or more of my siblings are overweight   How has your weight changed over the last year? Gained   How many pounds? 20     Overweight onset since gradeschool. Increase weight starting at 20 years old through 3 pregnancies, max weight of 225lbs. Was able to lose weight post partum to 175lbs after the first pregnancy. Moved to [place] in 2010 and lived there for 10 years. [place] 3 [place] and has noticed her weight increased. Weight gain has been gradual. Has been weight stable around 215lbs for the past 2 years. Significant family history of obesity. Has tried to lose weight through keto, intermitent fasting, and increase exercise with minimal weight loss. Hard to sustain diet when was not seeing results. Wants to lose weight to feel more comfortable overall.     2 meals a day with minimal snacking. Some increase hunger, but tries very hard not to give into it. Some thoughts of food. Minimal cravings of salty. Can get full, and stay full. No emotional eating. Some boredom eating. Drinks 4x12oz of water daily, pepsi (2-3xweek), OJ (at times with breakfast), tea with sugar. Will eat out 2-3xmonth.     Activity - walks with children night, active around the house, gym 3xweek - treadmill 20min + weight lifting.       AOMs:   - Wegovy denied - must trial phentermine for 12 weeks         8/11/2023     9:06 AM   Diet Recall Review with Patient   If you do eat breakfast, what types of food do you eat? Eggs, cheese, cream, mcmuffin, potatoes/tomatoes, omelet, Tea.   If you do eat lunch, what types of food do you typically eat? Baked Chicken/lamb,  tuna sandwhich, chipotle bowl, soups/salad, fried food at times.   If you do snack, what types of food do you typically eat? Sunflower Seeds, chips, candy   How many glasses of juice do you drink in a typical day? 1   How many of glasses of milk do you drink in a typical day? 1   If you do drink milk, what type? 1%   How many 8oz glasses of sugar containing drinks such as Bryan-Aid/sweet tea do you drink in a day? 1   How many cans/bottles of sugar pop/soda/tea/sports drinks do you drink in a day? 1   How many cans/bottles of diet pop/soda/tea or sports drink do you drink in a day? 1   How often do you have a drink of alcohol? Never           8/11/2023     9:06 AM   Eating Habits   Generally, my meals include foods like these bread, pasta, rice, potatoes, corn, crackers, sweet dessert, pop, or juice Once a Week   Generally, my meals include foods like these fried meats, brats, burgers, french fries, pizza, cheese, chips, or ice cream Once a Week   Eat fast food (like McDonalds, Burger Zander, Taco Bell) Less Than Weekly   Eat at a buffet or sit-down restaurant Less Than Weekly   Eat most of my meals in front of the TV or computer Never   Often skip meals, eat at random times, have no regular eating times A Few Times a Week   Rarely sit down for a meal but snack or graze throughout Once a Week   Eat extra snacks between meals Less Than Weekly   Eat most of my food at the end of the day Less Than Weekly   Eat in the middle of the night or wake up at night to eat A Few Times a Week   Eat extra snacks to prevent or correct low blood sugar Never   Eat to prevent acid reflux or stomach pain Never   Worry about not having enough food to eat Never   I eat when I am depressed Less Than Weekly   I eat when I am stressed Less Than Weekly   I eat when I am bored Once a Week   I eat when I am anxious Never   I eat when I am happy or as a reward Once a Week   I feel hungry all the time even if I just have eaten A Few Times a Week    Feeling full is important to me Everyday   I finish all the food on my plate even if I am already full Everyday   I can't resist eating delicious food or walk past the good food/smell Almost Everyday   I eat/snack without noticing that I am eating Never   I eat when I am preparing the meal Never   I eat more than usual when I see others eating Everyday   I have trouble not eating sweets, ice cream, cookies, or chips if they are around the house Once a Week   I think about food all day Less Than Weekly   What foods, if any, do you crave? Sweets/Candy/Chocolate   Please list any other foods you crave? Breakfast items are my favorite           8/11/2023     9:06 AM   Amount of Food   I feel out of control when eating Weekly   I eat a large amount of food, like a loaf of bread, a box of cookies, a pint/quart of ice cream, all at once Never   I eat a large amount of food even when I am not hungry Monthly   I eat rapidly Never   I eat alone because I feel embarrassed and do not want others to see how much I have eaten Never   I eat until I am uncomfortably full Monthly   I feel bad, disgusted, or guilty after I overeat Almost Everyday           8/11/2023     9:06 AM   Activity/Exercise History   How much of a typical 12 hour day do you spend sitting? Less Than Half the Day   How much of a typical 12 hour day do you spend lying down? Less Than Half the Day   How much of a typical day do you spend walking/standing? Most of the Day   How many hours (not including work) do you spend on the TV/Video Games/Computer/Tablet/Phone? 2-3 Hours   How many times a week are you active for the purpose of exercise? 2-3 Times a Week   What keeps you from being more active? Pain    Shortness of Breath    Lack of Time    Unsure What To Do    Worried People Will Look At Me   How many total minutes do you spend doing some activity for the purpose of exercising when you exercise? More Than 30 Minutes       PAST MEDICAL HISTORY:  No past  "medical history on file.        8/11/2023     9:06 AM   Work/Social History Reviewed With Patient   My employment status is Full-Time   My job is [employer]   How much of your job is spent on the computer or phone? Less Than 50%   How many hours do you spend commuting to work daily? 1   What is your marital status? /In a Relationship   If in a relationship, is your significant other overweight? No   If you have children, are they overweight? No   Who do you live with?  and children   Who does the food shopping? 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 Supportive  with 3 children - 11, 5 and 3 yo.     No ETOH, drugs, or THC    Hookah - rarely         8/11/2023     9:06 AM   Mental Health History Reviewed With Patient   Have you ever been physically or sexually abused? No   How often in the past 2 weeks have you felt little interest or pleasure in doing things? For Several Days   Over the past 2 weeks how often have you felt down, depressed, or hopeless? Not at all           8/11/2023     9:06 AM   Sleep History Reviewed With Patient   How many hours do you sleep at night? 6       MEDICATIONS:   Current Outpatient Medications   Medication Sig Dispense Refill    acetaminophen (TYLENOL) 325 MG tablet Take 1-2 tablets (325-650 mg) by mouth every 6 hours as needed for mild pain 100 tablet 1    levonorgestrel (MIRENA) 20 MCG/24HR IUD 1 each (20 mcg) by Intrauterine route continuous      phentermine (ADIPEX-P) 15 MG capsule Take 1 capsule (15 mg) by mouth every morning 30 capsule 3    polyethylene glycol (MIRALAX) 17 GM/Dose powder Take 17 g by mouth daily 850 g 3       ALLERGIES:   No Known Allergies        Objective   /76 (BP Location: Left arm, Patient Position: Sitting, Cuff Size: Adult Large)   Pulse 64   Ht 1.664 m (5' 5.5\")   Wt 97.8 kg (215 lb 8 oz)   LMP 06/25/2023   SpO2 99%   BMI 35.32 kg/m      Physical Exam   GENERAL: Healthy, alert and no distress  EYES: Eyes grossly normal to " inspection.  No discharge or erythema, or obvious scleral/conjunctival abnormalities.  RESP: No audible wheeze, cough, or visible cyanosis.  No visible retractions or increased work of breathing.    SKIN: Visible skin clear. No significant rash, abnormal pigmentation or lesions.  NEURO: Cranial nerves grossly intact.  Mentation and speech appropriate for age.  PSYCH: Mentation appears normal, affect normal/bright, judgement and insight intact, normal speech and appearance well-groomed.     Anti-obesity medication ROS:    HEENT  Hx of glaucoma: No    Cardiovascular  CAD:No  HTN:No    Gastrointestinal  GERD:No  Constipation:Yes  Liver Dz:No  H/O Pancreatitis:No    Psychiatric  Bipolar: No  Anxiety:No  Depression:Yes  History of alcohol/drug abuse: No  Hx of eating disorder:No    Endocrine  Personal or family hx of MTC or MEN2:No  Diabetes/prediabetes: No    Neurologic:  Hx of seizures: No  Hx of migraines: No  Memory Impairment: No      History of kidney stones: Yes, in 2011 when was pregnant   Kidney disease: No  Current birth control:  IUD    Taking Opioid/Narcotic: No      Sincerely,    SULEMA BARAJAS PA-C

## 2023-08-14 NOTE — PROGRESS NOTES
"55 minutes spent by me on the date of the encounter doing chart review, history and exam, documentation and further activities per the note    New Medical Weight Management Consult    PATIENT:  Dax Beckman  MRN:         [id number]  :         1990  TONY:         2023    Dear Dr. Georgina Gandhi,    I had the pleasure of seeing your patient, Dax Beckman. Full intake/assessment was done to determine barriers to weight loss success and develop a treatment plan. Dax Beckman is a 33 year old female interested in treatment of medical problems associated with excess weight. She has a height of 5' 5.5\", a weight of 215 lbs 8 oz, and the calculated Body mass index is 35.32 kg/m .        Assessment & Plan   Problem List Items Addressed This Visit          Digestive    Class 1 obesity due to excess calories with serious comorbidity and body mass index (BMI) of 34.0 to 34.9 in adult     Overweight onset in gradeschool. Increase weight through 3 pregnancies starting at 21yo. Was able to lose weight after 1st pregnancy to 175lbs. Previously live in [place] for 10 years, upon return to the  3 years ago gained weight. Has tried to lose weight through increase exercise, keto, and intermittent fasting with minimal weight loss. Significant family hx of obesity. Wants to lose weight to feel more comfortable overall.     Some increase hunger, but tries very hard not to give into it. Some thoughts of food. Minimal cravings of salty. Can get full, and stay full. No emotional eating. Some boredom eating.     Discussed AOMs to help with weight loss:   - Topiramate - can consider in the future. No contraindications. No hx of kidney stones or disease. Has IUD   - Naltrexone - can consider in the future. No contraindications. No hx of liver disease. Not taking opioids.   - GLP-1 - Wegovy prescribed by PCP. Was denied, needs to trial Phentermine for 12 weeks. Can appeal after phentermine trial. No contraindications. " "Discussed side effects, risks, and benefits.  - Phentermine - to be started today. No contraindications. Discussed side effects, risks, and benefits. No hx of HTN or anxiety. Will check BP in 1-2 weeks.            Relevant Medications    phentermine (ADIPEX-P) 15 MG capsule    Other Relevant Orders    CBC with platelets (Completed)    Comprehensive metabolic panel (Completed)    Hemoglobin A1c (Completed)    Parathyroid Hormone Intact (Completed)    Vitamin D Deficiency        Start Phentermine 15mg - take 1 tablet in the morning. Check BP in 1-2 weeks  Labs ordered   Follow up with Opal Esparza in 2-3 months. Will reach out via [employer] in 1 month   Dietitian as needed.           Dax Beckman is a 33 year old female who presents to clinic today for the following health issues.     She has the following co-morbidities:        8/11/2023     9:06 AM   --   I have the following health issues associated with obesity None of the above   I have the following symptoms associated with obesity Depression    Back Pain    Fatigue    Irregular Menstral Cycle     Depression - well controlled without medications. Follow by PCP     Constipation - goes every 3 days. Stools are hard. Started Miralax with relief.         8/11/2023     9:06 AM   Referring Provider   Please name the provider who referred you to Medical Weight Management  If you do not know, please answer \"I Don't Know\" Dr. Georgina Gandhi           8/11/2023     9:06 AM   Weight History   How concerned are you about your weight? Very Concerned   I became overweight As a Teenager   The following factors have contributed to my weight gain Mental Health Issues    Stress    Other   Please list the other factors IUD   I have tried the following methods to lose weight Watching Portions or Calories    Exercise    Fasting   My lowest weight since age 19 was 175   My highest weight since age 19 was 225   The most weight I have ever lost was (lbs) 10   I have the following family " history of obesity/being overweight One or more of my siblings are overweight   How has your weight changed over the last year? Gained   How many pounds? 20     Overweight onset since gradeschool. Increase weight starting at 20 years old through 3 pregnancies, max weight of 225lbs. Was able to lose weight post partum to 175lbs after the first pregnancy. Moved to [place] in 2010 and lived there for 10 years. [place] to the [place] around 3 years ago and has noticed her weight increased. Weight gain has been gradual. Has been weight stable around 215lbs for the past 2 years. Significant family history of obesity. Has tried to lose weight through keto, intermitent fasting, and increase exercise with minimal weight loss. Hard to sustain diet when was not seeing results. Wants to lose weight to feel more comfortable overall.     2 meals a day with minimal snacking. Some increase hunger, but tries very hard not to give into it. Some thoughts of food. Minimal cravings of salty. Can get full, and stay full. No emotional eating. Some boredom eating. Drinks 4x12oz of water daily, pepsi (2-3xweek), OJ (at times with breakfast), tea with sugar. Will eat out 2-3xmonth.     Activity - walks with children night, active around the house, gym 3xweek - treadmill 20min + weight lifting.       AOMs:   - Wegovy denied - must trial phentermine for 12 weeks         8/11/2023     9:06 AM   Diet Recall Review with Patient   If you do eat breakfast, what types of food do you eat? Eggs, cheese, cream, mcmuffin, potatoes/tomatoes, omelet, Tea.   If you do eat lunch, what types of food do you typically eat? Baked Chicken/lamb, tuna sandwhich, chipotle bowl, soups/salad, fried food at times.   If you do snack, what types of food do you typically eat? Sunflower Seeds, chips, candy   How many glasses of juice do you drink in a typical day? 1   How many of glasses of milk do you drink in a typical day? 1   If you do drink milk, what type? 1%   How many  8oz glasses of sugar containing drinks such as Bryan-Aid/sweet tea do you drink in a day? 1   How many cans/bottles of sugar pop/soda/tea/sports drinks do you drink in a day? 1   How many cans/bottles of diet pop/soda/tea or sports drink do you drink in a day? 1   How often do you have a drink of alcohol? Never           8/11/2023     9:06 AM   Eating Habits   Generally, my meals include foods like these bread, pasta, rice, potatoes, corn, crackers, sweet dessert, pop, or juice Once a Week   Generally, my meals include foods like these fried meats, brats, burgers, french fries, pizza, cheese, chips, or ice cream Once a Week   Eat fast food (like McDonalds, Burger Zander, Taco Bell) Less Than Weekly   Eat at a buffet or sit-down restaurant Less Than Weekly   Eat most of my meals in front of the TV or computer Never   Often skip meals, eat at random times, have no regular eating times A Few Times a Week   Rarely sit down for a meal but snack or graze throughout Once a Week   Eat extra snacks between meals Less Than Weekly   Eat most of my food at the end of the day Less Than Weekly   Eat in the middle of the night or wake up at night to eat A Few Times a Week   Eat extra snacks to prevent or correct low blood sugar Never   Eat to prevent acid reflux or stomach pain Never   Worry about not having enough food to eat Never   I eat when I am depressed Less Than Weekly   I eat when I am stressed Less Than Weekly   I eat when I am bored Once a Week   I eat when I am anxious Never   I eat when I am happy or as a reward Once a Week   I feel hungry all the time even if I just have eaten A Few Times a Week   Feeling full is important to me Everyday   I finish all the food on my plate even if I am already full Everyday   I can't resist eating delicious food or walk past the good food/smell Almost Everyday   I eat/snack without noticing that I am eating Never   I eat when I am preparing the meal Never   I eat more than usual when I  see others eating Everyday   I have trouble not eating sweets, ice cream, cookies, or chips if they are around the house Once a Week   I think about food all day Less Than Weekly   What foods, if any, do you crave? Sweets/Candy/Chocolate   Please list any other foods you crave? Breakfast items are my favorite           8/11/2023     9:06 AM   Amount of Food   I feel out of control when eating Weekly   I eat a large amount of food, like a loaf of bread, a box of cookies, a pint/quart of ice cream, all at once Never   I eat a large amount of food even when I am not hungry Monthly   I eat rapidly Never   I eat alone because I feel embarrassed and do not want others to see how much I have eaten Never   I eat until I am uncomfortably full Monthly   I feel bad, disgusted, or guilty after I overeat Almost Everyday           8/11/2023     9:06 AM   Activity/Exercise History   How much of a typical 12 hour day do you spend sitting? Less Than Half the Day   How much of a typical 12 hour day do you spend lying down? Less Than Half the Day   How much of a typical day do you spend walking/standing? Most of the Day   How many hours (not including work) do you spend on the TV/Video Games/Computer/Tablet/Phone? 2-3 Hours   How many times a week are you active for the purpose of exercise? 2-3 Times a Week   What keeps you from being more active? Pain    Shortness of Breath    Lack of Time    Unsure What To Do    Worried People Will Look At Me   How many total minutes do you spend doing some activity for the purpose of exercising when you exercise? More Than 30 Minutes       PAST MEDICAL HISTORY:  No past medical history on file.        8/11/2023     9:06 AM   Work/Social History Reviewed With Patient   My employment status is Full-Time   My job is [employer]   How much of your job is spent on the computer or phone? Less Than 50%   How many hours do you spend commuting to work daily? 1   What is your marital status? /In a  "Relationship   If in a relationship, is your significant other overweight? No   If you have children, are they overweight? No   Who do you live with?  and children   Who does the food shopping? 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 Supportive  with 3 children - 11, 5 and 3 yo.     No ETOH, drugs, or THC    Hookah - rarely         8/11/2023     9:06 AM   Mental Health History Reviewed With Patient   Have you ever been physically or sexually abused? No   How often in the past 2 weeks have you felt little interest or pleasure in doing things? For Several Days   Over the past 2 weeks how often have you felt down, depressed, or hopeless? Not at all           8/11/2023     9:06 AM   Sleep History Reviewed With Patient   How many hours do you sleep at night? 6       MEDICATIONS:   Current Outpatient Medications   Medication Sig Dispense Refill    acetaminophen (TYLENOL) 325 MG tablet Take 1-2 tablets (325-650 mg) by mouth every 6 hours as needed for mild pain 100 tablet 1    levonorgestrel (MIRENA) 20 MCG/24HR IUD 1 each (20 mcg) by Intrauterine route continuous      phentermine (ADIPEX-P) 15 MG capsule Take 1 capsule (15 mg) by mouth every morning 30 capsule 3    polyethylene glycol (MIRALAX) 17 GM/Dose powder Take 17 g by mouth daily 850 g 3       ALLERGIES:   No Known Allergies        Objective    /76 (BP Location: Left arm, Patient Position: Sitting, Cuff Size: Adult Large)   Pulse 64   Ht 1.664 m (5' 5.5\")   Wt 97.8 kg (215 lb 8 oz)   LMP 06/25/2023   SpO2 99%   BMI 35.32 kg/m      Physical Exam   GENERAL: Healthy, alert and no distress  EYES: Eyes grossly normal to inspection.  No discharge or erythema, or obvious scleral/conjunctival abnormalities.  RESP: No audible wheeze, cough, or visible cyanosis.  No visible retractions or increased work of breathing.    SKIN: Visible skin clear. No significant rash, abnormal pigmentation or lesions.  NEURO: Cranial nerves grossly intact.  " Mentation and speech appropriate for age.  PSYCH: Mentation appears normal, affect normal/bright, judgement and insight intact, normal speech and appearance well-groomed.     Anti-obesity medication ROS:    HEENT  Hx of glaucoma: No    Cardiovascular  CAD:No  HTN:No    Gastrointestinal  GERD:No  Constipation:Yes  Liver Dz:No  H/O Pancreatitis:No    Psychiatric  Bipolar: No  Anxiety:No  Depression:Yes  History of alcohol/drug abuse: No  Hx of eating disorder:No    Endocrine  Personal or family hx of MTC or MEN2:No  Diabetes/prediabetes: No    Neurologic:  Hx of seizures: No  Hx of migraines: No  Memory Impairment: No      History of kidney stones: Yes, in 2011 when was pregnant   Kidney disease: No  Current birth control:  IUD    Taking Opioid/Narcotic: No      Sincerely,    SULEMA BARAJAS PA-C

## 2023-08-14 NOTE — ASSESSMENT & PLAN NOTE
Overweight onset in gradeschool. Increase weight through 3 pregnancies starting at 19yo. Was able to lose weight after 1st pregnancy to 175lbs. Previously live in [place] for 10 years, [place] the [place] 3 [place] ago gained weight. Has tried to lose weight through increase exercise, keto, and intermittent fasting with minimal weight loss. Significant family hx of obesity. Wants to lose weight to feel more comfortable overall.     Some increase hunger, but tries very hard not to give into it. Some thoughts of food. Minimal cravings of salty. Can get full, and stay full. No emotional eating. Some boredom eating.     Discussed AOMs to help with weight loss:   - Topiramate - can consider in the future. No contraindications. No hx of kidney stones or disease. Has IUD   - Naltrexone - can consider in the future. No contraindications. No hx of liver disease. Not taking opioids.   - GLP-1 - Wegovy prescribed by PCP. Was denied, needs to trial Phentermine for 12 weeks. Can appeal after phentermine trial. No contraindications. Discussed side effects, risks, and benefits.  - Phentermine - to be started today. No contraindications. Discussed side effects, risks, and benefits. No hx of HTN or anxiety. Will check BP in 1-2 weeks.

## 2023-08-15 LAB — DEPRECATED CALCIDIOL+CALCIFEROL SERPL-MC: 12 UG/L (ref 20–75)

## 2023-08-21 DIAGNOSIS — E66.811 CLASS 1 OBESITY DUE TO EXCESS CALORIES WITH SERIOUS COMORBIDITY AND BODY MASS INDEX (BMI) OF 34.0 TO 34.9 IN ADULT: Primary | ICD-10-CM

## 2023-08-21 DIAGNOSIS — E66.09 CLASS 1 OBESITY DUE TO EXCESS CALORIES WITH SERIOUS COMORBIDITY AND BODY MASS INDEX (BMI) OF 34.0 TO 34.9 IN ADULT: Primary | ICD-10-CM

## 2023-08-21 DIAGNOSIS — E55.9 VITAMIN D DEFICIENCY: ICD-10-CM

## 2023-08-21 NOTE — CONFIDENTIAL NOTE
Medication Appeal Request    Please initiate an appeal for the requested medication: WEGOVY 0.25 MG/0.5ML SC SOAJ    Has a letter of medical necessity been completed in EPIC?   yes    Any additional lab values/information to include?     Would you like to include any research articles?               If yes please include the hyperlink(s) below or fax to    [phone] for Specialty/Retail               [phone] for Infusion/Clinic Administered.                Include the patients name and MRN on the fax cover sheet.

## 2023-08-21 NOTE — TELEPHONE ENCOUNTER
Medication Appeal Initiation    We have initiated an appeal for the requested medication:  Medication: WEGOVY 0.25 MG/0.5ML SC SOAJ  Appeal Start Date:  8/21/2023  Insurance Company: [employer]  Insurance Phone: [phone]  Insurance Fax: [phone]  Comments:       Manually faxed appeal request and LMN to insurance -

## 2023-08-24 DIAGNOSIS — E66.811 CLASS 1 OBESITY DUE TO EXCESS CALORIES WITH SERIOUS COMORBIDITY AND BODY MASS INDEX (BMI) OF 34.0 TO 34.9 IN ADULT: Primary | ICD-10-CM

## 2023-08-24 DIAGNOSIS — E66.09 CLASS 1 OBESITY DUE TO EXCESS CALORIES WITH SERIOUS COMORBIDITY AND BODY MASS INDEX (BMI) OF 34.0 TO 34.9 IN ADULT: Primary | ICD-10-CM

## 2023-08-24 RX ORDER — SEMAGLUTIDE 0.25 MG/.5ML
0.25 INJECTION, SOLUTION SUBCUTANEOUS WEEKLY
Qty: 2 ML | Refills: 0 | Status: SHIPPED | OUTPATIENT
Start: 2023-08-24 | End: 2024-01-05

## 2023-08-24 RX ORDER — SEMAGLUTIDE 0.5 MG/.5ML
0.5 INJECTION, SOLUTION SUBCUTANEOUS WEEKLY
Qty: 2 ML | Refills: 3 | Status: SHIPPED | OUTPATIENT
Start: 2023-09-23 | End: 2024-01-05

## 2023-08-24 NOTE — TELEPHONE ENCOUNTER
MEDICATION APPEAL APPROVED    Medication: WEGOVY 0.25 MG/0.5ML SC SOAJ  Authorization Effective Date: 7/22/2023  Authorization Expiration Date: 2/22/2024  Appeal Insurance Company: [employer]  Which Pharmacy is filling the prescription:    Patient Notified: Yes (pharmacy will notify patient when ready)

## 2023-09-07 ENCOUNTER — TELEPHONE (OUTPATIENT)
Dept: ENDOCRINOLOGY | Facility: CLINIC | Age: 33
End: 2023-09-07

## 2023-09-07 NOTE — TELEPHONE ENCOUNTER
General Call    Contacts         Type Contact Phone/Fax    09/07/2023 10:39 AM CDT Phone (Incoming) Dax Beckman (Self) [phone] (M)          Reason for Call:  Wegovy concerns    What are your questions or concerns:  pt is unable to locate Jumio in any pharmacies. She would like a call back to discuss options    Could we send this information to you in [facility] or would you prefer to receive a phone call?:   Patient would prefer a phone call   Okay to leave a detailed message?: Yes at Cell number on file:    Telephone Information:   Mobile [phone]

## 2023-11-07 ENCOUNTER — TELEPHONE (OUTPATIENT)
Dept: ENDOCRINOLOGY | Facility: CLINIC | Age: 33
End: 2023-11-07

## 2023-11-07 ENCOUNTER — VIRTUAL VISIT (OUTPATIENT)
Dept: ENDOCRINOLOGY | Facility: CLINIC | Age: 33
End: 2023-11-07
Payer: COMMERCIAL

## 2023-11-07 VITALS — HEIGHT: 66 IN | BODY MASS INDEX: 35.3 KG/M2

## 2023-11-07 DIAGNOSIS — E66.811 CLASS 1 OBESITY DUE TO EXCESS CALORIES WITH SERIOUS COMORBIDITY AND BODY MASS INDEX (BMI) OF 34.0 TO 34.9 IN ADULT: Primary | ICD-10-CM

## 2023-11-07 DIAGNOSIS — K59.09 OTHER CONSTIPATION: ICD-10-CM

## 2023-11-07 DIAGNOSIS — E66.09 CLASS 1 OBESITY DUE TO EXCESS CALORIES WITH SERIOUS COMORBIDITY AND BODY MASS INDEX (BMI) OF 34.0 TO 34.9 IN ADULT: Primary | ICD-10-CM

## 2023-11-07 DIAGNOSIS — R11.0 NAUSEA: ICD-10-CM

## 2023-11-07 PROCEDURE — 99443 PR PHYSICIAN TELEPHONE EVALUATION 21-30 MIN: CPT | Mod: 95

## 2023-11-07 RX ORDER — ONDANSETRON 4 MG/1
4 TABLET, ORALLY DISINTEGRATING ORAL EVERY 8 HOURS PRN
Qty: 15 TABLET | Refills: 0 | Status: SHIPPED | OUTPATIENT
Start: 2023-11-07

## 2023-11-07 RX ORDER — POLYETHYLENE GLYCOL 3350 17 G/17G
17 POWDER, FOR SOLUTION ORAL DAILY
Qty: 850 G | Refills: 3 | Status: SHIPPED | OUTPATIENT
Start: 2023-11-07 | End: 2024-10-01

## 2023-11-07 ASSESSMENT — PAIN SCALES - GENERAL: PAINLEVEL: MODERATE PAIN (4)

## 2023-11-07 NOTE — NURSING NOTE
Is the patient currently in the state of [place]? YES    Visit mode:VIDEO    If the visit is dropped, the patient can be reconnected by: VIDEO VISIT: Text to cell phone:   Telephone Information:   Mobile [phone]       Will anyone else be joining the visit? NO  (If patient encounters technical issues they should call [phone])    How would you like to obtain your AVS? MyChart    Are changes needed to the allergy or medication list? Pt stated no changes to allergies and Pt stated no med changes    Reason for visit: Follow Up    Cierra KLEIN

## 2023-11-07 NOTE — LETTER
2023       RE: Dax Beckman  [address] [facility] [address]     Dear Colleague,    Thank you for referring your patient, Dax Beckman, to the [facility] [place] [facility]. Please see a copy of my visit note below.    Dax is a 33 year old who is being evaluated via a billable telephone visit.      What phone number would you like to be contacted at? [phone]  How would you like to obtain your AVS? MyChart    Distant Location (provider location):  Off-site  Phone call duration: 20 minutes    Return Medical Weight Management Note     Dax Beckman  MRN:  [id number]  :  1990  TONY:  2023    Dear Georgina Gandhi MD,    I had the pleasure of seeing your patient Dax Beckman. She is a 33 year old female who I am continuing to see for treatment of obesity related to:        2023     9:06 AM   --   I have the following health issues associated with obesity None of the above   I have the following symptoms associated with obesity Depression    Back Pain    Fatigue    Irregular Menstral Cycle       Assessment & Plan  Problem List Items Addressed This Visit       Class 1 obesity due to excess calories with serious comorbidity and body mass index (BMI) of 34.0 to 34.9 in adult - Primary     Last seen 23 for new MWM.     Wegovy denied, needed to trial phentermine. Trialed Phentermine 15mg with side effects of irration and abdominal pain that was not tolerable. Wegovy was appealed and approved. Was not able to start Wegovy due to shortages. She would like to start another AOM today.     She will start Saxenda. No contraindications. Discussed side effects, risks, and benefits. Goal to transition to Wegovy in the future.          Relevant Medications    liraglutide - Weight Management (SAXENDA) 18 MG/3ML pen    insulin pen needle (31G X 5 MM) 31G X 5 MM miscellaneous    Other Relevant Orders     Med Therapy Management Referral     Other Visit Diagnoses       Other constipation        Relevant Medications    polyethylene glycol (MIRALAX) 17 GM/Dose powder    Nausea        Relevant Medications    ondansetron (ZOFRAN ODT) 4 MG ODT tab           Start Saxenda ramp up to 3.0mg once daily. If tolerating and approve, will transition to Wegovy as needed.   Zofran prescribed for as needed with possible nausea side effects.   Constipation - continue miralax 1 cap daily   Follow up with Lauren Bloch in 6 weeks   Follow up with Opal Esparza in 3 months     INTERVAL HISTORY:  New MWM 8/14/23  Wegovy denied - needed to trial phentermine   Phentermine - side effects of anxiety       Anti-obesity medication history    Current:   Wegovy - was not able to start due to shortages     Phentermine - had abdominal pain and increase irritation.     Would like to start a medication today.     Constipation is well controlled with miralax.     CURRENT WEIGHT:   0 lbs 0 oz    Initial Weight (lbs): 215.5 lbs  Last Visits Weight: 97.7 kg (215 lb 8 oz)              11/6/2023     1:51 PM   Changes and Difficulties   I have made the following changes to my diet since my last visit: Cut off pop....lessened sugar to just with tea in morning   With regards to my diet, I am still struggling with: Eating good full meals instead of snacking on smaller meals more often   I have made the following changes to my activity/exercise since my last visit: Trying to be more active   With regards to my activity/exercise, I am still struggling with: Be more consistent         MEDICATIONS:   Current Outpatient Medications   Medication Sig Dispense Refill    insulin pen needle (31G X 5 MM) 31G X 5 MM miscellaneous Use Saxenda pen needles daily or as directed. 100 each 1    liraglutide - Weight Management (SAXENDA) 18 MG/3ML pen Week 1: 0.6 mg subcutaneous daily, Week 2: 1.2 mg daily, Week 3: 1.8 mg daily, Week 4: 2.4 mg daily, Week 5 & on: 3 mg daily 15 mL 2     "ondansetron (ZOFRAN ODT) 4 MG ODT tab Take 1 tablet (4 mg) by mouth every 8 hours as needed for nausea 15 tablet 0    polyethylene glycol (MIRALAX) 17 GM/Dose powder Take 17 g by mouth daily 850 g 3    acetaminophen (TYLENOL) 325 MG tablet Take 1-2 tablets (325-650 mg) by mouth every 6 hours as needed for mild pain 100 tablet 1    Cholecalciferol (VITAMIN D-3) 125 MCG (5000 UT) TABS Take 1 capsule by mouth daily 30 tablet 11    levonorgestrel (MIRENA) 20 MCG/24HR IUD 1 each (20 mcg) by Intrauterine route continuous      Semaglutide-Weight Management (WEGOVY) 0.25 MG/0.5ML pen Inject 0.25 mg Subcutaneous once a week For 4 weeks (Patient not taking: Reported on 11/7/2023) 2 mL 0    Semaglutide-Weight Management (WEGOVY) 0.5 MG/0.5ML pen Inject 0.5 mg Subcutaneous once a week After completing 4 weeks of 0.25mg dose (Patient not taking: Reported on 11/7/2023) 2 mL 3           11/6/2023     1:51 PM   Weight Loss Medication History Reviewed With Patient   Which weight loss medications are you currently taking on a regular basis? None   If you are not taking a weight loss medication that was prescribed to you, please indicate why: Other         Objective   Ht 1.664 m (5' 5.51\")   BMI 35.30 kg/m      Vitals - Patient Reported  Pain Score: Moderate Pain (4)  Pain Loc: Low Back      PHYSICAL EXAM:    GENERAL: Healthy, alert and no distress  EYES: Eyes grossly normal to inspection.  No discharge or erythema, or obvious scleral/conjunctival abnormalities.  RESP: No audible wheeze, cough   NEURO: Mentation and speech appropriate for age.  PSYCH: Mentation appears normal, affect normal/bright, judgement and insight intact, normal speech and appearance well-groomed.        Sincerely,    SULEMA BARAJAS PA-C      30 minutes spent by me on the date of the encounter doing chart review, history and exam, documentation and further activities per the note    "

## 2023-11-07 NOTE — PATIENT INSTRUCTIONS
"Maciel Verduzco,   Thank you for allowing us the privilege of caring for you. We hope we provided you with the excellent service you deserve.   Please let us know if there is anything else we can do for you so that we can be sure you are completely satisfied with your care experience.    To ensure the quality of our services you may be receiving a patient satisfaction survey from an independent patient satisfaction monitoring company.    The greatest compliment you can give is a \"Likely to Recommend\"    Your visit was with SULEMA BARAJAS PA-C today.    Instructions per today's visit:     Start Saxenda ramp up to 3.0mg once daily. If tolerating and approve, will transition to Wegovy as needed.   Zofran prescribed for as needed with possible nausea side effects.   Constipation - continue miralax 1 cap daily   Follow up with Lauren Bloch in 6 weeks   Follow up with Sulema Esparza in 3 months     ___________________________________________________________________________  Important contact and scheduling information:  Please call our contact center at [phone] to schedule your next appointments.  For any nursing questions or concerns call Rosa Brown LPN at [phone] or Rosio Duran RN at [phone]  Please call during clinic hours Monday through Friday 8:00a - 4:00p if you have questions or you can contact us via [employer] at anytime and we will reply during clinic hours.    Lab results will be communicated through My Chart or letter (if My Chart not used). Please call the clinic if you have not received communication after 1 week or if you have any questions.?  Clinic Fax: [phone]  __________________________________________________________________________    If labs were ordered today:    Please make an appointment to have them drawn at your convenience.     To schedule the Lab Appointment using [employer]:  Select \"Schedule an Appointment\"  Select \"Lab Only\"  For \"A couple of questions\", select \"Other\"  For \"Which " locations work for you?, select the location and set up the appointment    To schedule by phone call [phone] to schedule a lab only appointment at any [facility] lab.  ___________________________________________________________________________  Work with A Health !  Virtual Sessions are Available through [facility] Weight Management Clinics    To learn more, call to schedule a free, Health  Q&A appointment: [phone]     What is Health Coaching?  Do you know what you are supposed to do, but you just aren't doing it?  Then, HEALTH COACHING may help you!   Get unstuck and move forward with the support of a professionally trained NBC-HWC (National Board-Certified Health and ) who uses evidence-based approaches to help you move forward with healthy lifestyle changes in the areas of weight loss, stress management and overall well-being.    Health Coaches help you identify goals that will work best for you. Health Coaches provide support and encouragement with overcoming barriers and help you to find inspiration and motivation to lead a healthy lifestyle.    Option one:  Health Coaching 3-Pack; Three, 30-minute Health Coaching Visits, for $99  Visits are done virtually (phone or video)  This is a self pay service; we do not accept insurance for isidro coaching.    Option two:   The 24 week Plan; 11 Health Coaching Visits, and a 7 months subscription to yourdelivery-- on-demand fitness, nutrition and mindfulness classes, for $499 (employee discounts may be available). Participants will also meet regularly with a weight management Medical Provider and a Registered/Licensed Dietician.  This is a self-pay service; we do not accept insurance for health coaching.    To Schedule a free Health  Q&A appointment to learn more,  call [phone].  ____________________________________________________________________  [facility]  Healthy  "Lifestyle Group    Healthy Lifestyle Group  This is a 60 minute virtual coaching group for those who want to lead a healthier lifestyle. Come together to set goals and overcome barriers in a supportive group environment. We will address the four pillars of health--nutrition, exercise, sleep and emotional well-being.  This group is highly recommended for those who are participating in the 24 week Healthy Lifestyle Plan and our Health Coaching sessions.    WHEN: This group meets the first Friday of the month, 12:30 PM - 1:30 PM online, via a zoom meeting.      FACILITATOR: Led by National Board Certified Health and , Destinee Guajardo, [facility].    TO REGISTER: Please call the Call Center at [phone] to register. You will get an appointment to attend in [facility]. Fifteen minutes prior to the meeting, complete the e-check in and you will get the link to join the meeting.  There is no charge to attend this group and space is limited.      2023 and 2024 Meeting Topics and Dates:    November 3: Introduction to Mindfulness (Learn simple and effective mindfulness practices and how it can benefit you)    December 8: Let's Talk (guided discussion on our wins and challenges)    January 5: New Years Vision: Manifest your Best 2024! (Guided imagery,  journaling and discussion)    February 2: Let's Talk    March 1: 10 Percent Happier by Alton Mart (Book Bites; a guided discussion on the nuggets of wisdom from favorite wellness books; no need to read the book but highly encouraged)    April 5: Let's Talk    May 3: \"Essentialism; The Disciplined Pursuit of Less by Tian Tan (book bites discussion)    June 7: Let's Talk    July 5: NO MEETING, off for the 4th of July Holiday    August 2: The Blue Zones, Secrets for Living a Longer Life by Alton Shepherd (book bites discussion)      If you would like bariatric surgery specific support group info please let your care team know.         Thank you,   CARMELA Carr" Comprehensive Weight Management Team                            SAXENDA (liralutide)    We are considering starting a GLP-1 (Glucagon-like Peptide-1) medication called Saxenda. One of the ways it works is by slowing down the rate that food leaves your stomach. You feel link and will eat less. It also helps regulate hormones that can help improve your blood sugars.    If you are a patient that checks blood sugars, continue to check as instructed by your doctor. Low blood sugars are rare but can happen if patients are on insulin or other oral agents. If you notice consistent low sugars or high sugars, your medication may need to be adjusted after your appointment. If this is the case, please call RN and provide her your blood sugar record from the last 3-4 days. The RN will get in touch with the doctor and call you back/Glophot message with recommendations. We tolerate high sugars for a bit, so if sugars are running 180-200, this is ok. As weight starts dropping the blood sugars should too. If readings are consistently over 200 for 1-2 weeks, then you should call the doctor/nurse.    Dosing for this medication:   Week 1- Inject 0.6 mg daily  Week 2- Inject 1.2 mg daily  Week 3- Inject 1.8 mg daily  Week 4- Inject 2.4 mg daily  Week 5 and thereafter- Inject 3.0 mg daily    Side effects of GLP- Medications include: The most common side effects are all GI related and consist of: nausea, constipation, diarrhea, burping, or gassiness. Patients are advised to eat slowly and less, and nausea typically passes if people can stick it out.     The risk of pancreatitis (inflammation of the pancreas) has been associated with this type of medication, but is very rare.  If you have had pancreatitis in the past, this medication may not be for you. Please let us know about any past history of pancreas problems.    Symptoms of pancreatitis include: Pain in your upper stomach area which may travel to your back and be worse after  eating. Your stomach area may be tender to the touch.  You may have vomiting or nausea and/or have a fever. If you should develop any of these symptoms, stop the medication and contact your primary care doctor. They will do a blood test to check for pancreatitis.         There is a small chance you may have some low blood sugar after taking the medication.   The signs of low blood sugar are:  Weakness  Shaky   Hungry  Sweating  Confusion      See below for ways to treat low blood sugar without adding in lots of extra calories.      Treating Low Blood Sugar    If you have symptoms of low blood sugar (sweating, shaking, dizzy, confused) eat 15 grams of carbs and wait 15 minutes:    Glucose Tabs are best for sugars under 70 -  Dex4 or BD Glucose tablets are good, you will need to take 3-4 of these to equal 15 grams.     One small box of raisins  4 oz fruit juice box or   cup fruit juice  1 small apple  1 small banana    cup canned fruit in water    English muffin or a slice of bread with jelly   1 low fat frozen waffle with sugar-free syrup    cup cottage cheese with   cup frozen or fresh blueberries  1 cup skim or low-fat milk    cup whole grain cereal  4-6 crackers such as Triscuits      This medication is usually not covered by insurance and can be quite expensive. Sometimes a prior authorization is required, which may take up to 1-2 weeks for an insurance company to make a decision if they will cover the medication. Please be patient, you will be notified after a decision has been made.    For any questions or concerns please send a [employer] message to our team or call our weight management call center at [phone] during regular business hours. For questions during evenings or weekends your messages will be addressed during the next business day.  For emergencies please call 911 or seek immediate medical care.      (Do not stop taking it if you don't think it's working. For some people it works without them  knowing it.)     In order to get refills of this or any medication we prescribe you must be seen in the medical weight mgmt clinic every 2-4 months. Please have your pharmacy fax a refill request to [phone].

## 2023-11-07 NOTE — TELEPHONE ENCOUNTER
PA Initiation    Medication: SAXENDA 18 MG/3ML SC SOPN  Insurance Company: [employer] - Phone [phone] Fax [phone]  Pharmacy Filling the Rx:    Filling Pharmacy Phone:    Filling Pharmacy Fax:    Start Date: 11/7/2023    Key: [id number]

## 2023-11-07 NOTE — PROGRESS NOTES
Dax is a 33 year old who is being evaluated via a billable telephone visit.      What phone number would you like to be contacted at? [phone]  How would you like to obtain your AVS? Yahir    Distant Location (provider location):  Off-site  Phone call duration: 20 minutes    Return Medical Weight Management Note     Dax Beckman  MRN:  [id number]  :  1990  TONY:  2023    Dear Georgina Gandhi MD,    I had the pleasure of seeing your patient Dax Beckman. She is a 33 year old female who I am continuing to see for treatment of obesity related to:        2023     9:06 AM   --   I have the following health issues associated with obesity None of the above   I have the following symptoms associated with obesity Depression    Back Pain    Fatigue    Irregular Menstral Cycle       Assessment & Plan   Problem List Items Addressed This Visit       Class 1 obesity due to excess calories with serious comorbidity and body mass index (BMI) of 34.0 to 34.9 in adult - Primary     Last seen 23 for new MWM.     Wegovy denied, needed to trial phentermine. Trialed Phentermine 15mg with side effects of irration and abdominal pain that was not tolerable. Wegovy was appealed and approved. Was not able to start Wegovy due to shortages. She would like to start another AOM today.     She will start Saxenda. No contraindications. Discussed side effects, risks, and benefits. Goal to transition to Wegovy in the future.          Relevant Medications    liraglutide - Weight Management (SAXENDA) 18 MG/3ML pen    insulin pen needle (31G X 5 MM) 31G X 5 MM miscellaneous    Other Relevant Orders    Med Therapy Management Referral     Other Visit Diagnoses       Other constipation        Relevant Medications    polyethylene glycol (MIRALAX) 17 GM/Dose powder    Nausea        Relevant Medications    ondansetron (ZOFRAN ODT) 4 MG ODT tab           Start Saxenda ramp up to 3.0mg once daily. If tolerating and approve,  will transition to Wegovy as needed.   Zofran prescribed for as needed with possible nausea side effects.   Constipation - continue miralax 1 cap daily   Follow up with Lauren Bloch in 6 weeks   Follow up with Opal Esparza in 3 months     INTERVAL HISTORY:  New MWM 8/14/23  Wegovy denied - needed to trial phentermine   Phentermine - side effects of anxiety       Anti-obesity medication history    Current:   Wegovy - was not able to start due to shortages     Phentermine - had abdominal pain and increase irritation.     Would like to start a medication today.     Constipation is well controlled with miralax.     CURRENT WEIGHT:   0 lbs 0 oz    Initial Weight (lbs): 215.5 lbs  Last Visits Weight: 97.7 kg (215 lb 8 oz)              11/6/2023     1:51 PM   Changes and Difficulties   I have made the following changes to my diet since my last visit: Cut off pop....lessened sugar to just with tea in morning   With regards to my diet, I am still struggling with: Eating good full meals instead of snacking on smaller meals more often   I have made the following changes to my activity/exercise since my last visit: Trying to be more active   With regards to my activity/exercise, I am still struggling with: Be more consistent         MEDICATIONS:   Current Outpatient Medications   Medication Sig Dispense Refill    insulin pen needle (31G X 5 MM) 31G X 5 MM miscellaneous Use Saxenda pen needles daily or as directed. 100 each 1    liraglutide - Weight Management (SAXENDA) 18 MG/3ML pen Week 1: 0.6 mg subcutaneous daily, Week 2: 1.2 mg daily, Week 3: 1.8 mg daily, Week 4: 2.4 mg daily, Week 5 & on: 3 mg daily 15 mL 2    ondansetron (ZOFRAN ODT) 4 MG ODT tab Take 1 tablet (4 mg) by mouth every 8 hours as needed for nausea 15 tablet 0    polyethylene glycol (MIRALAX) 17 GM/Dose powder Take 17 g by mouth daily 850 g 3    acetaminophen (TYLENOL) 325 MG tablet Take 1-2 tablets (325-650 mg) by mouth every 6 hours as needed for mild pain 100  "tablet 1    Cholecalciferol (VITAMIN D-3) 125 MCG (5000 UT) TABS Take 1 capsule by mouth daily 30 tablet 11    levonorgestrel (MIRENA) 20 MCG/24HR IUD 1 each (20 mcg) by Intrauterine route continuous      Semaglutide-Weight Management (WEGOVY) 0.25 MG/0.5ML pen Inject 0.25 mg Subcutaneous once a week For 4 weeks (Patient not taking: Reported on 11/7/2023) 2 mL 0    Semaglutide-Weight Management (WEGOVY) 0.5 MG/0.5ML pen Inject 0.5 mg Subcutaneous once a week After completing 4 weeks of 0.25mg dose (Patient not taking: Reported on 11/7/2023) 2 mL 3           11/6/2023     1:51 PM   Weight Loss Medication History Reviewed With Patient   Which weight loss medications are you currently taking on a regular basis? None   If you are not taking a weight loss medication that was prescribed to you, please indicate why: Other         Objective    Ht 1.664 m (5' 5.51\")   BMI 35.30 kg/m      Vitals - Patient Reported  Pain Score: Moderate Pain (4)  Pain Loc: Low Back      PHYSICAL EXAM:    GENERAL: Healthy, alert and no distress  EYES: Eyes grossly normal to inspection.  No discharge or erythema, or obvious scleral/conjunctival abnormalities.  RESP: No audible wheeze, cough   NEURO: Mentation and speech appropriate for age.  PSYCH: Mentation appears normal, affect normal/bright, judgement and insight intact, normal speech and appearance well-groomed.        Sincerely,    SULEMA BARAJAS PA-C      30 minutes spent by me on the date of the encounter doing chart review, history and exam, documentation and further activities per the note  "

## 2023-11-07 NOTE — PROGRESS NOTES
"Virtual Visit Details    Type of service:  Video Visit   Video Start Time: {video visit start/end time for provider to select:344957}  Video End Time:{video visit start/end time for provider to select:870094}    Originating Location (pt. Location): {video visit patient location:303150::\"Home\"}  {PROVIDER LOCATION On-site should be selected for visits conducted from your clinic location or adjoining [facility] hospital, academic office, or other nearby [facility] building. Off-site should be selected for all other provider locations, including home:665624}  Distant Location (provider location):  {virtual location provider:804724}  Platform used for Video Visit: {Virtual Visit Platforms:590832::\"vSocial\"}    "

## 2023-11-07 NOTE — ASSESSMENT & PLAN NOTE
Last seen 8/14/23 for new MWM.     Wegovy denied, needed to trial phentermine. Trialed Phentermine 15mg with side effects of irration and abdominal pain that was not tolerable. Wegovy was appealed and approved. Was not able to start Wegovy due to shortages. She would like to start another AOM today.     She will start Saxenda. No contraindications. Discussed side effects, risks, and benefits. Goal to transition to Wegovy in the future.

## 2023-11-07 NOTE — TELEPHONE ENCOUNTER
Prior Authorization Approval    Medication: SAXENDA 18 MG/3ML SC SOPN  Authorization Effective Date: 10/8/2023  Authorization Expiration Date: 5/5/2024  Approved Dose/Quantity: 15ml/30 days   Reference #: [id number]   Insurance Company: [employer] - Phone [phone] Fax [phone]  Expected CoPay: $ 3  CoPay Card Available:      Financial Assistance Needed: no  Which Pharmacy is filling the prescription:    Pharmacy Notified: no  Patient Notified: pharmacy will notify patient

## 2024-01-05 ENCOUNTER — VIRTUAL VISIT (OUTPATIENT)
Dept: CARDIOLOGY | Facility: CLINIC | Age: 34
End: 2024-01-05
Payer: COMMERCIAL

## 2024-01-05 VITALS — HEIGHT: 65 IN | BODY MASS INDEX: 35.86 KG/M2

## 2024-01-05 DIAGNOSIS — E66.812 CLASS 2 SEVERE OBESITY WITH SERIOUS COMORBIDITY AND BODY MASS INDEX (BMI) OF 35.0 TO 35.9 IN ADULT, UNSPECIFIED OBESITY TYPE (H): Primary | ICD-10-CM

## 2024-01-05 DIAGNOSIS — E66.01 CLASS 2 SEVERE OBESITY WITH SERIOUS COMORBIDITY AND BODY MASS INDEX (BMI) OF 35.0 TO 35.9 IN ADULT, UNSPECIFIED OBESITY TYPE (H): Primary | ICD-10-CM

## 2024-01-05 DIAGNOSIS — E55.9 VITAMIN D INSUFFICIENCY: ICD-10-CM

## 2024-01-05 DIAGNOSIS — R73.03 PRE-DIABETES: ICD-10-CM

## 2024-01-05 ASSESSMENT — PAIN SCALES - GENERAL: PAINLEVEL: MILD PAIN (2)

## 2024-01-05 NOTE — NURSING NOTE
Is the patient currently in the state of [place]? YES    Visit mode:TELEPHONE    If the visit is dropped, the patient can be reconnected by: TELEPHONE VISIT: Phone number:   Telephone Information:   Mobile [phone]       Will anyone else be joining the visit? NO  (If patient encounters technical issues they should call [phone] :[phone])    How would you like to obtain your AVS? MyChart    Are changes needed to the allergy or medication list? Yes    Please remove any meds marked not taking and any flagged for removal.    Reason for visit: Consult    Wt/ht other than 24 hrs:  none given but says over 200lb  Pain more than one location:  no  Cecily PIPERF

## 2024-01-05 NOTE — PATIENT INSTRUCTIONS
"Recommendations from today's MTM visit:                                                       Start Saxenda following taper instructions   Start Vitamin D - take daily     Follow-up: 6 weeks with medication therapy management, Call Weight loss clinic scheduling line: [phone] to schedule 3 months appointment with Opal Callaway PA-C    It was great speaking with you today.  I value your experience and would be very thankful for your time in providing feedback in our clinic survey. In the next few days, you may receive an email or text message from [employer] with a link to a survey related to your  clinical pharmacist.\"     To schedule another MTM appointment, please call the clinic directly or you may call the MTM scheduling line at [phone].    My Clinical Pharmacist's contact information:                                                      Please feel free to contact me with any questions or concerns you have.      Elizabeth Simental, PharmD, [facility]  Medication Therapy Management Pharmacist        "

## 2024-01-05 NOTE — PROGRESS NOTES
Medication Therapy Management (MTM) Encounter    ASSESSMENT:                            Medication Adherence/Access: No issues identified    Weight Management/prediabetes:   Provided counseling on Saxenda again before starting.     Vitamin D Insufficiency:  Encouraged to start vitaminD    PLAN:                            Start Saxenda following taper instructions   Start Vitamin D - take daily     Follow-up: 6 weeks with medication therapy management, Call Weight loss clinic scheduling line: [phone] to schedule 3 months appointment with Opal Callaway PA-C    SUBJECTIVE/OBJECTIVE:                          Dax Beckman is a 33 year old female called for an initial visit. She was referred to me from Opal Callaway PA-C    Reason for visit: Saxenda follow-up.    Allergies/ADRs: Reviewed in chart  Past Medical History: Reviewed in chart  Tobacco: She reports that she has never smoked. She has never used smokeless tobacco.  Alcohol: not currently using  Was out of the country recently, just returned.  Medication Adherence/Access:     Weight Management/prediabetes:  Saxenda - hasn't started yet since was traveling out of the country   Has ondansetron and Miralax to take as needed for side effect management     Followed by Opal Callaway PA-C, seen 11/7/23 for Return Medical Weight Management.   Diet/Eating Habits: Patient reports 2 meals per day (noon and 5-6 pm).   Exercise/Activity: Patient reports not focusing on lately due to being busy  Tried/Failed/Contraindicated Medications:   Wegovy approved with an appeal but not available at starting dose.   Phentermine - irritation and abdominal pain  Current weight today: 0 lbs 0 oz  Initial Consult Weight 215 lbs  Wt Readings from Last 4 Encounters:   08/14/23 97.8 kg (215 lb 8 oz)   07/24/23 96.3 kg (212 lb 3.2 oz)   04/30/23 95.7 kg (211 lb)   08/14/22 89.4 kg (197 lb)     Estimated body mass index is 35.86 kg/m  as calculated from the following:    Height  "as of this encounter: 1.651 m (5' 5\").    Weight as of 8/14/23: 97.8 kg (215 lb 8 oz).    Hemoglobin A1C   Date Value Ref Range Status   08/14/2023 5.7 (H) <5.7 % Final     Comment:     Normal <5.7%   Prediabetes 5.7-6.4%    Diabetes 6.5% or higher     Note: Adopted from ADA consensus guidelines.       Vitamin D Insufficiency:  Vitamin D 5000 units daily - not taking     Vitamin D Deficiency Screening Results:  Lab Results   Component Value Date    VITDT 12 (L) 08/14/2023       Today's Vitals: Ht 1.651 m (5' 5\")   BMI 35.86 kg/m    ----------------    I spent 10 minutes with this patient today. All changes were made via collaborative practice agreement with Opal Callaway PA-C . A copy of the visit note was provided to the patient's provider(s).    A summary of these recommendations was sent via [employer].    Telemedicine Visit Details  Type of service:  Telephone visit  Start Time:  2:10 pm  End Time:  2:20 pm     Medication Therapy Recommendations  No medication therapy recommendations to display     "

## 2024-01-05 NOTE — LETTER
1/5/2024      RE: Dax Beckman  [id number] [facility] [address]       Dear Colleague,    Thank you for the opportunity to participate in the care of your patient, Dax Beckman, at the [facility] [place] at [facility]. Please see a copy of my visit note below.    Virtual Visit Details    Type of service:  Telephone Visit   Phone call duration: 10 minutes     Medication Therapy Management (MTM) Encounter    ASSESSMENT:                            Medication Adherence/Access: No issues identified    Weight Management/prediabetes:   Provided counseling on Saxenda again before starting.     Vitamin D Insufficiency:  Encouraged to start vitaminD    PLAN:                            Start Saxenda following taper instructions   Start Vitamin D - take daily     Follow-up: 6 weeks with medication therapy management, Call Weight loss clinic scheduling line: [phone] to schedule 3 months appointment with Opal Callaway PA-C    SUBJECTIVE/OBJECTIVE:                          Dax Beckman is a 33 year old female called for an initial visit. She was referred to me from Opal Callaway PA-C    Reason for visit: Saxenda follow-up.    Allergies/ADRs: Reviewed in chart  Past Medical History: Reviewed in chart  Tobacco: She reports that she has never smoked. She has never used smokeless tobacco.  Alcohol: not currently using  Was out of the country recently, just returned.  Medication Adherence/Access:     Weight Management/prediabetes:  Saxenda - hasn't started yet since was traveling out of the country   Has ondansetron and Miralax to take as needed for side effect management     Followed by Opal Callaway PA-C, seen 11/7/23 for Return Medical Weight Management.   Diet/Eating Habits: Patient reports 2 meals per day (noon and 5-6 pm).   Exercise/Activity: Patient reports not focusing on lately due to being busy  Tried/Failed/Contraindicated  "Medications:   Wegovy approved with an appeal but not available at starting dose.   Phentermine - irritation and abdominal pain  Current weight today: 0 lbs 0 oz  Initial Consult Weight 215 lbs  Wt Readings from Last 4 Encounters:   08/14/23 97.8 kg (215 lb 8 oz)   07/24/23 96.3 kg (212 lb 3.2 oz)   04/30/23 95.7 kg (211 lb)   08/14/22 89.4 kg (197 lb)     Estimated body mass index is 35.86 kg/m  as calculated from the following:    Height as of this encounter: 1.651 m (5' 5\").    Weight as of 8/14/23: 97.8 kg (215 lb 8 oz).    Hemoglobin A1C   Date Value Ref Range Status   08/14/2023 5.7 (H) <5.7 % Final     Comment:     Normal <5.7%   Prediabetes 5.7-6.4%    Diabetes 6.5% or higher     Note: Adopted from ADA consensus guidelines.       Vitamin D Insufficiency:  Vitamin D 5000 units daily - not taking     Vitamin D Deficiency Screening Results:  Lab Results   Component Value Date    VITDT 12 (L) 08/14/2023       Today's Vitals: Ht 1.651 m (5' 5\")   BMI 35.86 kg/m    ----------------    I spent 10 minutes with this patient today. All changes were made via collaborative practice agreement with Opal Callaway PA-C . A copy of the visit note was provided to the patient's provider(s).    A summary of these recommendations was sent via [employer].    Telemedicine Visit Details  Type of service:  Telephone visit  Start Time:  2:10 pm  End Time:  2:20 pm     Medication Therapy Recommendations  No medication therapy recommendations to display       Please do not hesitate to contact me if you have any questions/concerns.     Sincerely,     WINNIE DING RPH  "

## 2024-06-23 ENCOUNTER — ANCILLARY PROCEDURE (OUTPATIENT)
Dept: GENERAL RADIOLOGY | Facility: CLINIC | Age: 34
End: 2024-06-23
Attending: FAMILY MEDICINE
Payer: COMMERCIAL

## 2024-06-23 ENCOUNTER — OFFICE VISIT (OUTPATIENT)
Dept: URGENT CARE | Facility: URGENT CARE | Age: 34
End: 2024-06-23
Payer: COMMERCIAL

## 2024-06-23 VITALS
HEART RATE: 70 BPM | RESPIRATION RATE: 14 BRPM | DIASTOLIC BLOOD PRESSURE: 82 MMHG | WEIGHT: 215 LBS | SYSTOLIC BLOOD PRESSURE: 119 MMHG | BODY MASS INDEX: 35.78 KG/M2 | OXYGEN SATURATION: 99 % | TEMPERATURE: 97.9 F

## 2024-06-23 DIAGNOSIS — S99.922A INJURY OF TOE ON LEFT FOOT, INITIAL ENCOUNTER: ICD-10-CM

## 2024-06-23 DIAGNOSIS — S99.922A INJURY OF TOE ON LEFT FOOT, INITIAL ENCOUNTER: Primary | ICD-10-CM

## 2024-06-23 PROCEDURE — 73660 X-RAY EXAM OF TOE(S): CPT | Mod: TC | Performed by: RADIOLOGY

## 2024-06-23 PROCEDURE — 99213 OFFICE O/P EST LOW 20 MIN: CPT | Performed by: FAMILY MEDICINE

## 2024-06-23 NOTE — PROGRESS NOTES
Assessment & Plan     Injury of toe on left foot, initial encounter  Differentials discussed in detail including left second toe sprain.  X-ray findings reviewed independently, no acute fracture noted, small calcification along the ulnar aspect of second toe.  Recommended rest, icing, over-the-counter analgesia and to follow-up if symptoms persist or worsen.  Patient understood and in agreement with above plan.  All questions answered.  - XR Toe Left G/E 2 Views; Future      Subjective   Dax is a 33 year old, presenting for the following health issues:  Toe Injury (Left foot, pt stubbed toe a few days ago. Yesterday swelling, throbbing and pain. )    HPI   Concern -   Onset: few days ago  Description: Left foot second toe, pt stubbed toe a few days ago, swelling, throbbing and pain.   Intensity: moderate  Progression of Symptoms:  same  Accompanying Signs & Symptoms: none  Therapies tried and outcome: None      Review of Systems  Constitutional, HEENT, cardiovascular, pulmonary, gi and gu systems are negative, except as otherwise noted.        Objective    /82 (BP Location: Left arm, Cuff Size: Adult Regular)   Pulse 70   Temp 97.9  F (36.6  C) (Tympanic)   Resp 14   Wt 97.5 kg (215 lb)   LMP 06/20/2024 (Approximate)   SpO2 99%   BMI 35.78 kg/m    Body mass index is 35.78 kg/m .  Physical Exam   GENERAL: alert and no distress  EYES: Eyes grossly normal to inspection, PERRL and conjunctivae and sclerae normal  RESP: no wheezes  MS: Left foot second toe mildly swollen and tender on palpation, no significant skin discoloration noted, joint range of movement normal, pedal pulses 3+  NEURO: Normal strength and tone, mentation intact and speech normal  PSYCH: mentation appears normal, affect normal/bright    Results for orders placed or performed in visit on 06/23/24   XR Toe Left G/E 2 Views     Status: None    Narrative    EXAM: XR TOE LEFT G/E 2 VIEWS  LOCATION: [facility]  DATE:  6/23/2024    INDICATION: Second toe injury, pain  COMPARISON: None.      Impression    IMPRESSION: Normal joint spaces. No dislocation. Mild hallux valgus. No acute fracture. Small rounded periarticular calcifications along the ulnar dorsal aspect of the second toe PIP joint could represent calcific periarthritis.         Signed Electronically by: Deangelo Meyer MD

## 2024-07-09 DIAGNOSIS — K59.09 OTHER CONSTIPATION: ICD-10-CM

## 2024-07-09 RX ORDER — POLYETHYLENE GLYCOL 3350 17 G/17G
17 POWDER, FOR SOLUTION ORAL DAILY
Qty: 850 G | Refills: 3 | Status: CANCELLED | OUTPATIENT
Start: 2024-07-09

## 2024-07-10 NOTE — TELEPHONE ENCOUNTER
Refill denied at this time. Patient needs appointment with [facility] pharmacist and MICHAEL. [employer] message sent to patient to schedule appointment.

## 2024-07-10 NOTE — TELEPHONE ENCOUNTER
axatives Protocol [id number]/09/2024 10:18 AM   Protocol Details Recent (12 mo) or future (90 days) visit within the authorizing provider's specialty

## 2024-07-29 ENCOUNTER — OFFICE VISIT (OUTPATIENT)
Dept: FAMILY MEDICINE | Facility: CLINIC | Age: 34
End: 2024-07-29
Payer: COMMERCIAL

## 2024-07-29 VITALS
RESPIRATION RATE: 16 BRPM | OXYGEN SATURATION: 97 % | TEMPERATURE: 97.8 F | DIASTOLIC BLOOD PRESSURE: 74 MMHG | WEIGHT: 216.6 LBS | BODY MASS INDEX: 34.81 KG/M2 | HEART RATE: 82 BPM | HEIGHT: 66 IN | SYSTOLIC BLOOD PRESSURE: 126 MMHG

## 2024-07-29 DIAGNOSIS — E66.09 CLASS 1 OBESITY DUE TO EXCESS CALORIES WITH SERIOUS COMORBIDITY AND BODY MASS INDEX (BMI) OF 34.0 TO 34.9 IN ADULT: ICD-10-CM

## 2024-07-29 DIAGNOSIS — E66.811 CLASS 1 OBESITY DUE TO EXCESS CALORIES WITH SERIOUS COMORBIDITY AND BODY MASS INDEX (BMI) OF 34.0 TO 34.9 IN ADULT: ICD-10-CM

## 2024-07-29 DIAGNOSIS — Z00.00 ROUTINE GENERAL MEDICAL EXAMINATION AT A HEALTH CARE FACILITY: Primary | ICD-10-CM

## 2024-07-29 PROBLEM — F39 MOOD DISORDER (H): Status: RESOLVED | Noted: 2023-07-24 | Resolved: 2024-07-29

## 2024-07-29 PROCEDURE — 99395 PREV VISIT EST AGE 18-39: CPT | Performed by: FAMILY MEDICINE

## 2024-07-29 SDOH — HEALTH STABILITY: PHYSICAL HEALTH: ON AVERAGE, HOW MANY MINUTES DO YOU ENGAGE IN EXERCISE AT THIS LEVEL?: 30 MIN

## 2024-07-29 SDOH — HEALTH STABILITY: PHYSICAL HEALTH: ON AVERAGE, HOW MANY DAYS PER WEEK DO YOU ENGAGE IN MODERATE TO STRENUOUS EXERCISE (LIKE A BRISK WALK)?: 1 DAY

## 2024-07-29 ASSESSMENT — PAIN SCALES - GENERAL: PAINLEVEL: NO PAIN (0)

## 2024-07-29 ASSESSMENT — SOCIAL DETERMINANTS OF HEALTH (SDOH): HOW OFTEN DO YOU GET TOGETHER WITH FRIENDS OR RELATIVES?: TWICE A WEEK

## 2024-07-29 NOTE — PROGRESS NOTES
"Preventive Care Visit  [facility]  Georgina Gandhi MD, Family Medicine  Jul 29, 2024      Assessment & Plan     (Z00.00) Routine general medical examination at a health care facility  (primary encounter diagnosis)  Comment: preventive needs reviewed   Plan: see orders in Epic.     (E66.09,  Z68.34) Class 1 obesity due to excess calories with serious comorbidity and body mass index (BMI) of 34.0 to 34.9 in adult  Comment: stable, no loss despite effort  Plan: has wegovy at home, will consider starting  Discussed unlikely that IUD is the source of lack of wt gain and other hormonal systemic contraception will likely have more influence.              BMI  Estimated body mass index is 34.96 kg/m  as calculated from the following:    Height as of this encounter: 1.676 m (5' 6\").    Weight as of this encounter: 98.2 kg (216 lb 9.6 oz).   Weight management plan: Discussed healthy diet and exercise guidelines    Counseling  Appropriate preventive services were addressed with this patient via screening, questionnaire, or discussion as appropriate for fall prevention, nutrition, physical activity, Tobacco-use cessation, weight loss and cognition.  Checklist reviewing preventive services available has been given to the patient.  Reviewed patient's diet, addressing concerns and/or questions.   She is at risk for lack of exercise and has been provided with information to increase physical activity for the benefit of her well-being.   She is at risk for psychosocial distress and has been provided with information to reduce risk.       See Patient Instructions    Amanda Verduzco is a 33 year old, presenting for the following:  Physical        7/29/2024     1:24 PM   Additional Questions   Roomed by Shin        Health Care Directive  Patient does not have a Health Care Directive or Living Will: Discussed advance care planning with patient; information given to patient to review.    HPI  Wondering if " Mirena IUD is causing wt gain or lack of wt loss.                7/29/2024   General Health   How would you rate your overall physical health? (!) POOR   Feel stress (tense, anxious, or unable to sleep) Only a little      (!) STRESS CONCERN      7/29/2024   Nutrition   Three or more servings of calcium each day? (!) NO   Diet: Regular (no restrictions)   How many servings of fruit and vegetables per day? (!) 2-3   How many sweetened beverages each day? 0-1            7/29/2024   Exercise   Days per week of moderate/strenous exercise 1 day   Average minutes spent exercising at this level 30 min      (!) EXERCISE CONCERN      7/29/2024   Social Factors   Frequency of gathering with friends or relatives Twice a week   Worry food won't last until get money to buy more No   Food not last or not have enough money for food? No   Do you have housing? (Housing is defined as stable permanent housing and does not include staying ouside in a car, in a tent, in an abandoned building, in an overnight shelter, or couch-surfing.) Yes   Are you worried about losing your housing? No   Lack of transportation? No   Unable to get utilities (heat,electricity)? No            7/29/2024   Dental   Dentist two times every year? Yes            7/29/2024   TB Screening   Were you born outside of the [place]? Yes              Today's PHQ-2 Score:       1/5/2024     1:32 PM   PHQ-2 ( 1999 [employer])   Q1: Little interest or pleasure in doing things 0   Q2: Feeling down, depressed or hopeless 0   PHQ-2 Score 0         7/29/2024   Substance Use   Alcohol more than 3/day or more than 7/wk Not Applicable   Do you use any other substances recreationally? No        Social History     Tobacco Use    Smoking status: Never    Smokeless tobacco: Never   Vaping Use    Vaping status: Never Used   Substance Use Topics    Alcohol use: No    Drug use: No             7/29/2024   Breast Cancer Screening   Family history of breast, colon, or ovarian cancer? No / Unknown  "        Mammogram Screening - Patient under 40 years of age: Routine Mammogram Screening not recommended.       G 3 P 3   Patient's last menstrual period was 07/17/2024 (approximate).     Fasting: No   Td: tdap 11/2019       Flu: 9/2021      Covid: 2/2022      Shingrix: NA      PPV: NA       RSV: NA              Cholesterol:   Lab Results   Component Value Date    CHOL 126 07/24/2023     Lab Results   Component Value Date    HDL 38 07/24/2023     Lab Results   Component Value Date    LDL 75 07/24/2023     Lab Results   Component Value Date    TRIG 64 07/24/2023     No results found for: \"CHOLHDLRATIO\"      MMG: NA  Dexa:  NA     Flex/colo: NA      Seat Belt: Yes    Sunscreen use: Yes   Calcium Intake: adeq  Health Care Directive: No  Sexually Active: Yes     Current contraception: Mirena IUD  History of abnormal Pap smear: No  Family history of colon/breast/ovarian cancer: No  Regular self breast exam: No  History of abnormal mammogram: No          7/29/2024   STI Screening   New sexual partner(s) since last STI/HIV test? No        History of abnormal Pap smear: No - age 30- 64 PAP with HPV every 5 years recommended        Latest Ref Rng & Units 7/20/2022     3:03 PM 10/8/2019    11:48 AM 10/21/2016    12:00 AM   PAP / HPV   PAP  Negative for Intraepithelial Lesion or Malignancy (NILM)      PAP (Historical)   NIL     HPV 16 DNA Negative Negative      HPV 18 DNA Negative Negative      Other HR HPV Negative Negative      PAP-ABSTRACT    See Scanned Document           This result is from an external source.           7/29/2024   Contraception/Family Planning   Questions about contraception or family planning (!) YES            Reviewed and updated as needed this visit by Provider   Tobacco  Allergies  Meds  Problems  Med Hx  Surg Hx  Fam Hx            Labs reviewed in EPIC  BP Readings from Last 3 Encounters:   07/29/24 126/74   06/23/24 119/82   08/14/23 109/76    Wt Readings from Last 3 Encounters:   07/29/24 " "98.2 kg (216 lb 9.6 oz)   06/23/24 97.5 kg (215 lb)   08/14/23 97.8 kg (215 lb 8 oz)                  Patient Active Problem List   Diagnosis    Mood disorder (H24)    Class 1 obesity due to excess calories with serious comorbidity and body mass index (BMI) of 34.0 to 34.9 in adult     Past Surgical History:   Procedure Laterality Date    LASIK BILATERAL  2015       Social History     Tobacco Use    Smoking status: Never    Smokeless tobacco: Never   Substance Use Topics    Alcohol use: No     Family History   Problem Relation Age of Onset    Lipids Father     Heart Disease Father     Diabetes Father          Current Outpatient Medications   Medication Sig Dispense Refill    acetaminophen (TYLENOL) 325 MG tablet Take 1-2 tablets (325-650 mg) by mouth every 6 hours as needed for mild pain 100 tablet 1    levonorgestrel (MIRENA) 20 MCG/24HR IUD 1 each (20 mcg) by Intrauterine route continuous      ondansetron (ZOFRAN ODT) 4 MG ODT tab Take 1 tablet (4 mg) by mouth every 8 hours as needed for nausea 15 tablet 0    polyethylene glycol (MIRALAX) 17 GM/Dose powder Take 17 g by mouth daily 850 g 3         Review of Systems  Constitutional, neuro, ENT, endocrine, pulmonary, cardiac, gastrointestinal, genitourinary, musculoskeletal, integument and psychiatric systems are negative, except as otherwise noted.     Objective    Exam  /74   Pulse 82   Temp 97.8  F (36.6  C) (Oral)   Resp 16   Ht 1.676 m (5' 6\")   Wt 98.2 kg (216 lb 9.6 oz)   LMP 07/17/2024 (Approximate)   SpO2 97%   BMI 34.96 kg/m     Estimated body mass index is 34.96 kg/m  as calculated from the following:    Height as of this encounter: 1.676 m (5' 6\").    Weight as of this encounter: 98.2 kg (216 lb 9.6 oz).    Physical Exam  GENERAL: alert and no distress  EYES: Eyes grossly normal to inspection, PERRL and conjunctivae and sclerae normal  HENT: ear canals and TM's normal, nose and mouth without ulcers or lesions  NECK: no adenopathy, no " asymmetry, masses, or scars  RESP: lungs clear to auscultation - no rales, rhonchi or wheezes  BREAST: normal without masses, tenderness or nipple discharge and no palpable axillary masses or adenopathy  CV: regular rate and rhythm, normal S1 S2, no S3 or S4, no murmur, click or rub, no peripheral edema  ABDOMEN: soft, nontender, no hepatosplenomegaly, no masses and bowel sounds normal  MS: no gross musculoskeletal defects noted, no edema  SKIN: no suspicious lesions or rashes  NEURO: Normal strength and tone, mentation intact and speech normal  PSYCH: mentation appears normal, affect normal/bright        Signed Electronically by: Georgina Gandhi MD

## 2024-07-29 NOTE — PATIENT INSTRUCTIONS
Patient Education   Preventive Care Advice   This is general advice given by our system to help you stay healthy. However, your care team may have specific advice just for you. Please talk to your care team about your preventive care needs.  Nutrition  Eat 5 or more servings of fruits and vegetables each day.  Try wheat bread, brown rice and whole grain pasta (instead of white bread, rice, and pasta).  Get enough calcium and vitamin D. Check the label on foods and aim for 100% of the RDA (recommended daily allowance).  Lifestyle  Exercise at least 150 minutes each week  (30 minutes a day, 5 days a week).  Do muscle strengthening activities 2 days a week. These help control your weight and prevent disease.  No smoking.  Wear sunscreen to prevent skin cancer.  Have a dental exam and cleaning every 6 months.  Yearly exams  See your health care team every year to talk about:  Any changes in your health.  Any medicines your care team has prescribed.  Preventive care, family planning, and ways to prevent chronic diseases.  Shots (vaccines)   HPV shots (up to age 26), if you've never had them before.  Hepatitis B shots (up to age 59), if you've never had them before.  COVID-19 shot: Get this shot when it's due.  Flu shot: Get a flu shot every year.  Tetanus shot: Get a tetanus shot every 10 years.  Pneumococcal, hepatitis A, and RSV shots: Ask your care team if you need these based on your risk.  Shingles shot (for age 50 and up)  General health tests  Diabetes screening:  Starting at age 35, Get screened for diabetes at least every 3 years.  If you are younger than age 35, ask your care team if you should be screened for diabetes.  Cholesterol test: At age 39, start having a cholesterol test every 5 years, or more often if advised.  Bone density scan (DEXA): At age 50, ask your care team if you should have this scan for osteoporosis (brittle bones).  Hepatitis C: Get tested at least once in your life.  STIs (sexually  transmitted infections)  Before age 24: Ask your care team if you should be screened for STIs.  After age 24: Get screened for STIs if you're at risk. You are at risk for STIs (including HIV) if:  You are sexually active with more than one person.  You don't use condoms every time.  You or a partner was diagnosed with a sexually transmitted infection.  If you are at risk for HIV, ask about PrEP medicine to prevent HIV.  Get tested for HIV at least once in your life, whether you are at risk for HIV or not.  Cancer screening tests  Cervical cancer screening: If you have a cervix, begin getting regular cervical cancer screening tests starting at age 21.  Breast cancer scan (mammogram): If you've ever had breasts, begin having regular mammograms starting at age 40. This is a scan to check for breast cancer.  Colon cancer screening: It is important to start screening for colon cancer at age 45.  Have a colonoscopy test every 10 years (or more often if you're at risk) Or, ask your provider about stool tests like a FIT test every year or Cologuard test every 3 years.  To learn more about your testing options, visit:   .  For help making a decision, visit:   https://bit.ly/ts37134.  Prostate cancer screening test: If you have a prostate, ask your care team if a prostate cancer screening test (PSA) at age 55 is right for you.  Lung cancer screening: If you are a current or former smoker ages 50 to 80, ask your care team if ongoing lung cancer screenings are right for you.  For informational purposes only. Not to replace the advice of your health care provider. Copyright   2023 [place] [employer]. All rights reserved. Clinically reviewed by the [facility]. Model Metrics 573521 - REV 01/24.

## 2024-10-01 ENCOUNTER — VIRTUAL VISIT (OUTPATIENT)
Dept: ENDOCRINOLOGY | Facility: CLINIC | Age: 34
End: 2024-10-01
Payer: COMMERCIAL

## 2024-10-01 ENCOUNTER — TELEPHONE (OUTPATIENT)
Dept: ENDOCRINOLOGY | Facility: CLINIC | Age: 34
End: 2024-10-01

## 2024-10-01 VITALS — BODY MASS INDEX: 34.55 KG/M2 | WEIGHT: 215 LBS | HEIGHT: 66 IN

## 2024-10-01 DIAGNOSIS — K59.09 OTHER CONSTIPATION: ICD-10-CM

## 2024-10-01 DIAGNOSIS — R11.0 NAUSEA: ICD-10-CM

## 2024-10-01 DIAGNOSIS — E66.01 CLASS 2 SEVERE OBESITY WITH SERIOUS COMORBIDITY AND BODY MASS INDEX (BMI) OF 35.0 TO 35.9 IN ADULT, UNSPECIFIED OBESITY TYPE (H): ICD-10-CM

## 2024-10-01 DIAGNOSIS — E66.812 CLASS 2 SEVERE OBESITY WITH SERIOUS COMORBIDITY AND BODY MASS INDEX (BMI) OF 35.0 TO 35.9 IN ADULT, UNSPECIFIED OBESITY TYPE (H): ICD-10-CM

## 2024-10-01 PROCEDURE — G2211 COMPLEX E/M VISIT ADD ON: HCPCS | Mod: 95

## 2024-10-01 PROCEDURE — 99213 OFFICE O/P EST LOW 20 MIN: CPT | Mod: 95

## 2024-10-01 RX ORDER — SEMAGLUTIDE 0.5 MG/.5ML
0.5 INJECTION, SOLUTION SUBCUTANEOUS WEEKLY
Qty: 2 ML | Refills: 0 | OUTPATIENT
Start: 2024-10-31 | End: 2024-10-01

## 2024-10-01 RX ORDER — SEMAGLUTIDE 1 MG/.5ML
1 INJECTION, SOLUTION SUBCUTANEOUS WEEKLY
Qty: 2 ML | Refills: 2 | OUTPATIENT
Start: 2024-10-01 | End: 2024-10-02

## 2024-10-01 RX ORDER — SEMAGLUTIDE 1 MG/.5ML
1 INJECTION, SOLUTION SUBCUTANEOUS WEEKLY
Qty: 2 ML | Refills: 2 | OUTPATIENT
Start: 2024-10-01 | End: 2024-10-01

## 2024-10-01 RX ORDER — POLYETHYLENE GLYCOL 3350 17 G/17G
17 POWDER, FOR SOLUTION ORAL DAILY
Qty: 850 G | Refills: 3 | Status: SHIPPED | OUTPATIENT
Start: 2024-10-01

## 2024-10-01 RX ORDER — SEMAGLUTIDE 0.5 MG/.5ML
0.5 INJECTION, SOLUTION SUBCUTANEOUS WEEKLY
Qty: 2 ML | Refills: 0 | OUTPATIENT
Start: 2024-10-31 | End: 2024-10-02

## 2024-10-01 RX ORDER — ONDANSETRON 4 MG/1
4 TABLET, ORALLY DISINTEGRATING ORAL EVERY 8 HOURS PRN
Qty: 12 TABLET | Refills: 0 | Status: SHIPPED | OUTPATIENT
Start: 2024-10-01

## 2024-10-01 NOTE — NURSING NOTE
Current patient location: [id number] [place] [id number]    Is the patient currently in the state of [place]? YES    Visit mode:VIDEO    If the visit is dropped, the patient can be reconnected by: VIDEO VISIT: Text to cell phone:   Telephone Information:   Mobile [phone]       Will anyone else be joining the visit? NO  (If patient encounters technical issues they should call [phone]    Are changes needed to the allergy or medication list? No    Are refills needed on medications prescribed by this physician? NO    Rooming Documentation:  Not applicable    Reason for visit: JIHAN PIPERF

## 2024-10-01 NOTE — TELEPHONE ENCOUNTER
PA Initiation    Medication: WEGOVY 0.5 MG/0.5ML SC SOAJ  Insurance Company: [facility] - Phone [phone] Fax [phone]  Pharmacy Filling the Rx: [facility] - [place], [place] - [address]  Filling Pharmacy Phone:    Filling Pharmacy Fax:    Start Date: 10/1/2024    Key: [id number]

## 2024-10-01 NOTE — Clinical Note
Hi,   Saw Dax for the first time in a year. She just started the Saxendaa this past month - she was nervous to start it. Currently taking 1.8mg, having SE of nausea. I transited her to Wegovy 0.5mg and sent in zofran. She will continue Saxenda 1.8mg until she has Wegovy. You see her on 11/22.   ISA

## 2024-10-01 NOTE — LETTER
10/1/2024       RE: Dax Beckman  [address] [facility] [address]     Dear Colleague,    Thank you for referring your patient, Dax Beckman, to the [facility] WEIGHT MANAGEMENT CLINIC [place] at [facility]. Please see a copy of my visit note below.    Virtual Visit Details    Type of service:  Video Visit   Video Start Time:  10:35AM  Video End Time: 11:00AM    Originating Location (pt. Location): Home    Distant Location (provider location):  Off-site  Platform used for Video Visit: [facility] Medical Weight Management Note     Dax Beckman  MRN:  [id number]  :  1990  TONY:  10/1/2024    Dear Georgina Gandhi MD,    I had the pleasure of seeing your patient Dax Beckman. She is a 34 year old female who I am continuing to see for treatment of obesity related to:        2023     9:06 AM   --   I have the following health issues associated with obesity None of the above   I have the following symptoms associated with obesity Depression    Back Pain    Fatigue    Irregular Menstral Cycle       Assessment & Plan  Problem List Items Addressed This Visit       Class 2 severe obesity with serious comorbidity and body mass index (BMI) of 35.0 to 35.9 in adult, unspecified obesity type (H)     Last seen 1 year ago.  Was very nervous to start Saxenda.  She just darted Saxenda 1 month ago.  Currently on day 4 of 1.8 mg dose.  She is having side effects of nausea daily after her injection.  Constipation has gotten worse, but has improved with MiraLAX.  She does not think it has been helpful with hunger or weight.  Due to side effects we will transition to Wegovy today.  She will start Wegovy 1 day after her last injection of Saxenda.         Relevant Medications    Semaglutide-Weight Management (WEGOVY) 1 MG/0.5ML pen (Start on 2024)    Semaglutide-Weight Management (WEGOVY) 0.5 MG/0.5ML pen    Other Relevant Orders    Adult  Bariatrics and Weight Management Clinic Follow-Up Order     Other Visit Diagnoses       Nausea        Relevant Medications    ondansetron (ZOFRAN ODT) 4 MG ODT tab    Other constipation        Relevant Medications    polyethylene glycol (MIRALAX) 17 GM/Dose powder           Continue Saxenda 1.8mg once daily until you have Wegovy. The day after last Saxenda injection - start Wegovy 0.5mg once weekly for 4 weeks, then increase to 1.0mg once weekly.   Constipation - continue miralax 1 cap daily   Nausea - take zofran as needed   Able to come to clinic for body composition scale   Elizabeth Simental [facility] pharmacist on 11/22/2024   Opal Esparza in 3 months    INTERVAL HISTORY:  [facility] 8/14/23  Wegovy denied - needed to trial phentermine   Phentermine - side effects of anxiety   Wegovy appeal approved, but not able to start due to shortages.   Started Saxenda       Anti-obesity medication history    Current:   Saxenda - was very nervous about starting. Just started this month. 1.8mg - has taken around 5 days. Side effects of nausea and heart burn - having every night. No vomiting. Unsure if has been helpful. Having worse constipation - well controlled on miralax. Taking at 1:30-2:30pm every day.     Past/Failed/contraindicated:   Phentermine - SE of anxiety     Recent diet changes: Eating 3meals a day, with a snack. Has been having a hard time with a consistent schedule. No changes in hunger since starting Saxenda. Drinking 30-48oz of water daily.   Breakfast - eggs, bagel, cheese + bread   Lunch - chicken + rice, stew  Dinner - Leftovers from lunch, cereal  Snacks - fruit, nutella + banana or strawberry     Recent exercise/activity changes: has been harder with how busy she has been. Walks kids to school    Recent stressors: Has had a lot of stress the past year - moved, started going to school, working full time.     Recent sleep changes: sleeping 4-6hours nightly.         CURRENT WEIGHT:   215 lbs 0 oz    Initial Weight  (lbs): 215 lbs     Cumulative weight loss (lbs): 0  Weight Loss Percentage: 0%    Wt Readings from Last 5 Encounters:   10/01/24 97.5 kg (215 lb)   07/29/24 98.2 kg (216 lb 9.6 oz)   06/23/24 97.5 kg (215 lb)   08/14/23 97.8 kg (215 lb 8 oz)   07/24/23 96.3 kg (212 lb 3.2 oz)             10/1/2024    10:20 AM   Changes and Difficulties   I have made the following changes to my diet since my last visit: September 10th I started the injections   With regards to my diet, I am still struggling with: Losing weight   I have made the following changes to my activity/exercise since my last visit: I try and get out and walk more. Life has been busy.   With regards to my activity/exercise, I am still struggling with: Going to the gym.         MEDICATIONS:   Current Outpatient Medications   Medication Sig Dispense Refill     ondansetron (ZOFRAN ODT) 4 MG ODT tab Take 1 tablet (4 mg) by mouth every 8 hours as needed for nausea. 12 tablet 0     polyethylene glycol (MIRALAX) 17 GM/Dose powder Take 17 g by mouth daily. 850 g 3     Semaglutide-Weight Management (WEGOVY) 0.5 MG/0.5ML pen Inject 0.5 mg subcutaneously once a week. For 4 weeks 2 mL 0     [START ON 11/1/2024] Semaglutide-Weight Management (WEGOVY) 1 MG/0.5ML pen Inject 1 mg subcutaneously once a week. After completing for weeks of 0.5mg dose 2 mL 2     acetaminophen (TYLENOL) 325 MG tablet Take 1-2 tablets (325-650 mg) by mouth every 6 hours as needed for mild pain 100 tablet 1     levonorgestrel (MIRENA) 20 MCG/24HR IUD 1 each (20 mcg) by Intrauterine route continuous       ondansetron (ZOFRAN ODT) 4 MG ODT tab Take 1 tablet (4 mg) by mouth every 8 hours as needed for nausea 15 tablet 0           10/1/2024    10:20 AM   Weight Loss Medication History Reviewed With Patient   Which weight loss medications are you currently taking on a regular basis? Saxenda (liraglutide)   Are you having any side effects from the weight loss medication that we have prescribed you? Yes  "  If you are having side effects please describe: Chest burn. Some upset stomache     :  Objective   Ht 1.664 m (5' 5.51\")   Wt 97.5 kg (215 lb)   BMI 35.22 kg/m             Vitals:  No vitals were obtained today due to virtual visit.    GENERAL: alert and no distress  EYES: Eyes grossly normal to inspection.  No discharge or erythema, or obvious scleral/conjunctival abnormalities.  RESP: No audible wheeze, cough, or visible cyanosis.    SKIN: Visible skin clear. No significant rash, abnormal pigmentation or lesions.  NEURO: Cranial nerves grossly intact.  Mentation and speech appropriate for age.  PSYCH: Appropriate affect, tone, and pace of words        Sincerely,    Opal Callaway PA-C      24 minutes spent by me on the date of the encounter doing chart review, history and exam, documentation and further activities per the note    The longitudinal plan of care for the diagnosis(es)/condition(s) as documented were addressed during this visit. Due to the added complexity in care, I will continue to support Dax in the subsequent management and with ongoing continuity of care.      Again, thank you for allowing me to participate in the care of your patient.      Sincerely,    Opal Callaway PA-C    "

## 2024-10-01 NOTE — PROGRESS NOTES
Virtual Visit Details    Type of service:  Video Visit   Video Start Time:  10:35AM  Video End Time: 11:00AM    Originating Location (pt. Location): Home    Distant Location (provider location):  Off-site  Platform used for Video Visit: [facility] Medical Weight Management Note     Dax Beckman  MRN:  [id number]  :  1990  TONY:  10/1/2024    Dear Georgina Gandhi MD,    I had the pleasure of seeing your patient Dax Beckman. She is a 34 year old female who I am continuing to see for treatment of obesity related to:        2023     9:06 AM   --   I have the following health issues associated with obesity None of the above   I have the following symptoms associated with obesity Depression    Back Pain    Fatigue    Irregular Menstral Cycle       Assessment & Plan   Problem List Items Addressed This Visit       Class 2 severe obesity with serious comorbidity and body mass index (BMI) of 35.0 to 35.9 in adult, unspecified obesity type (H)     Last seen 1 year ago.  Was very nervous to start Saxenda.  She just darted Saxenda 1 month ago.  Currently on day 4 of 1.8 mg dose.  She is having side effects of nausea daily after her injection.  Constipation has gotten worse, but has improved with MiraLAX.  She does not think it has been helpful with hunger or weight.  Due to side effects we will transition to Wegovy today.  She will start Wegovy 1 day after her last injection of Saxenda.         Relevant Medications    Semaglutide-Weight Management (WEGOVY) 1 MG/0.5ML pen (Start on 2024)    Semaglutide-Weight Management (WEGOVY) 0.5 MG/0.5ML pen    Other Relevant Orders    Adult Bariatrics and Weight Management Clinic Follow-Up Order     Other Visit Diagnoses       Nausea        Relevant Medications    ondansetron (ZOFRAN ODT) 4 MG ODT tab    Other constipation        Relevant Medications    polyethylene glycol (MIRALAX) 17 GM/Dose powder           Continue Saxenda 1.8mg once daily until you have  Wegovy. The day after last Saxenda injection - start Wegovy 0.5mg once weekly for 4 weeks, then increase to 1.0mg once weekly.   Constipation - continue miralax 1 cap daily   Nausea - take zofran as needed   Able to come to clinic for body composition scale   Elizabeth Simental [facility] pharmacist on 11/22/2024   Opal Esparza in 3 months    INTERVAL HISTORY:  [facility] 8/14/23  Wegovy denied - needed to trial phentermine   Phentermine - side effects of anxiety   Wegovy appeal approved, but not able to start due to shortages.   Started Saxenda       Anti-obesity medication history    Current:   Saxenda - was very nervous about starting. Just started this month. 1.8mg - has taken around 5 days. Side effects of nausea and heart burn - having every night. No vomiting. Unsure if has been helpful. Having worse constipation - well controlled on miralax. Taking at 1:30-2:30pm every day.     Past/Failed/contraindicated:   Phentermine - SE of anxiety     Recent diet changes: Eating 3meals a day, with a snack. Has been having a hard time with a consistent schedule. No changes in hunger since starting Saxenda. Drinking 30-48oz of water daily.   Breakfast - eggs, bagel, cheese + bread   Lunch - chicken + rice, stew  Dinner - Leftovers from lunch, cereal  Snacks - fruit, nutella + banana or strawberry     Recent exercise/activity changes: has been harder with how busy she has been. Walks kids to school    Recent stressors: Has had a lot of stress the past year - moved, started going to school, working full time.     Recent sleep changes: sleeping 4-6hours nightly.         CURRENT WEIGHT:   215 lbs 0 oz    Initial Weight (lbs): 215 lbs     Cumulative weight loss (lbs): 0  Weight Loss Percentage: 0%    Wt Readings from Last 5 Encounters:   10/01/24 97.5 kg (215 lb)   07/29/24 98.2 kg (216 lb 9.6 oz)   06/23/24 97.5 kg (215 lb)   08/14/23 97.8 kg (215 lb 8 oz)   07/24/23 96.3 kg (212 lb 3.2 oz)             10/1/2024    10:20 AM   Changes and  "Difficulties   I have made the following changes to my diet since my last visit: September 10th I started the injections   With regards to my diet, I am still struggling with: Losing weight   I have made the following changes to my activity/exercise since my last visit: I try and get out and walk more. Life has been busy.   With regards to my activity/exercise, I am still struggling with: Going to the gym.         MEDICATIONS:   Current Outpatient Medications   Medication Sig Dispense Refill    ondansetron (ZOFRAN ODT) 4 MG ODT tab Take 1 tablet (4 mg) by mouth every 8 hours as needed for nausea. 12 tablet 0    polyethylene glycol (MIRALAX) 17 GM/Dose powder Take 17 g by mouth daily. 850 g 3    Semaglutide-Weight Management (WEGOVY) 0.5 MG/0.5ML pen Inject 0.5 mg subcutaneously once a week. For 4 weeks 2 mL 0    [START ON 11/1/2024] Semaglutide-Weight Management (WEGOVY) 1 MG/0.5ML pen Inject 1 mg subcutaneously once a week. After completing for weeks of 0.5mg dose 2 mL 2    acetaminophen (TYLENOL) 325 MG tablet Take 1-2 tablets (325-650 mg) by mouth every 6 hours as needed for mild pain 100 tablet 1    levonorgestrel (MIRENA) 20 MCG/24HR IUD 1 each (20 mcg) by Intrauterine route continuous      ondansetron (ZOFRAN ODT) 4 MG ODT tab Take 1 tablet (4 mg) by mouth every 8 hours as needed for nausea 15 tablet 0           10/1/2024    10:20 AM   Weight Loss Medication History Reviewed With Patient   Which weight loss medications are you currently taking on a regular basis? Saxenda (liraglutide)   Are you having any side effects from the weight loss medication that we have prescribed you? Yes   If you are having side effects please describe: Chest burn. Some upset stomache     :  Objective    Ht 1.664 m (5' 5.51\")   Wt 97.5 kg (215 lb)   BMI 35.22 kg/m             Vitals:  No vitals were obtained today due to virtual visit.    GENERAL: alert and no distress  EYES: Eyes grossly normal to inspection.  No discharge or " erythema, or obvious scleral/conjunctival abnormalities.  RESP: No audible wheeze, cough, or visible cyanosis.    SKIN: Visible skin clear. No significant rash, abnormal pigmentation or lesions.  NEURO: Cranial nerves grossly intact.  Mentation and speech appropriate for age.  PSYCH: Appropriate affect, tone, and pace of words        Sincerely,    Opal Callaway PA-C      24 minutes spent by me on the date of the encounter doing chart review, history and exam, documentation and further activities per the note    The longitudinal plan of care for the diagnosis(es)/condition(s) as documented were addressed during this visit. Due to the added complexity in care, I will continue to support Dax in the subsequent management and with ongoing continuity of care.

## 2024-10-02 PROBLEM — E66.01 CLASS 2 SEVERE OBESITY WITH SERIOUS COMORBIDITY AND BODY MASS INDEX (BMI) OF 35.0 TO 35.9 IN ADULT, UNSPECIFIED OBESITY TYPE (H): Status: ACTIVE | Noted: 2024-10-02

## 2024-10-02 PROBLEM — E66.812 CLASS 2 SEVERE OBESITY WITH SERIOUS COMORBIDITY AND BODY MASS INDEX (BMI) OF 35.0 TO 35.9 IN ADULT, UNSPECIFIED OBESITY TYPE (H): Status: ACTIVE | Noted: 2024-10-02

## 2024-10-02 RX ORDER — SEMAGLUTIDE 0.5 MG/.5ML
0.5 INJECTION, SOLUTION SUBCUTANEOUS WEEKLY
Qty: 2 ML | Refills: 0 | Status: SHIPPED | OUTPATIENT
Start: 2024-10-02

## 2024-10-02 RX ORDER — SEMAGLUTIDE 1 MG/.5ML
1 INJECTION, SOLUTION SUBCUTANEOUS WEEKLY
Qty: 2 ML | Refills: 2 | Status: SHIPPED | OUTPATIENT
Start: 2024-11-01

## 2024-10-02 NOTE — TELEPHONE ENCOUNTER
Prior Authorization Approval    Medication: WEGOVY 0.5 MG/0.5ML SC SOAJ  Authorization Effective Date: 10/2/2024  Authorization Expiration Date: 4/2/2025  Approved Dose/Quantity: 2ml per 28 days  Reference #: Key: [id number]   Insurance Company: [employer] - Phone [phone] Fax [phone]  Expected CoPay: $ 0  CoPay Card Available: No    Financial Assistance Needed: no  Which Pharmacy is filling the prescription: [facility] - [place], [place] - [facility] [address]   Pharmacy Notified: yes  Patient Notified: yes

## 2024-10-03 NOTE — PATIENT INSTRUCTIONS
"Maciel Verduzco,   Thank you for allowing us the privilege of caring for you. We hope we provided you with the excellent service you deserve.   Please let us know if there is anything else we can do for you so that we can be sure you are completely satisfied with your care experience.    To ensure the quality of our services you may be receiving a patient satisfaction survey from an independent patient satisfaction monitoring company.    The greatest compliment you can give is a \"Likely to Recommend\"    Your visit was with Opal Callaway PA-C today.    Instructions per today's visit:     Continue Saxenda 1.8mg once daily until you have Wegovy. The day after last Saxenda injection - start Wegovy 0.5mg once weekly for 4 weeks, then increase to 1.0mg once weekly.   Constipation - continue miralax 1 cap daily   Nausea - take zofran as needed   Able to come to clinic for body composition scale   [facility]kee pharmacist on 11/22/2024   Opal Esparza in 3 months    ___________________________________________________________________________  Important contact and scheduling information:  Please call our contact center at [phone] to schedule your next appointments.  For any nursing questions or concerns call Rosa Brown LPN at [phone] or Rosio Duran RN at [phone]  Please call during clinic hours Monday through Friday 8:00a - 4:00p if you have questions or you can contact us via [employer] at anytime and we will reply during clinic hours.    Lab results will be communicated through My Chart or letter (if My Chart not used). Please call the clinic if you have not received communication after 1 week or if you have any questions.?  Clinic Fax: [phone]  __________________________________________________________________________    If labs were ordered today:    Please make an appointment to have them drawn at your convenience.     To schedule the Lab Appointment using [employer]:  Select \"Schedule an " "Appointment\"  Select \"Lab Only\"  For \"A couple of questions\", select \"Other\"  For \"Which locations work for you?, select the location and set up the appointment    To schedule by phone call [phone] to schedule a lab only appointment at any [facility] lab.  ___________________________________________________________________________  Work with A Health !  Virtual Sessions are Available through [facility] Weight Management Clinics    To learn more, call to schedule a free, Health  Q&A appointment: [phone]     What is Health Coaching?  Do you know what you are supposed to do, but you just aren't doing it?  Then, HEALTH COACHING may help you!   Get unstuck and move forward with the support of a professionally trained NBC-HWC (National Board-Certified Health and ) who uses evidence-based approaches to help you move forward with healthy lifestyle changes in the areas of weight loss, stress management and overall well-being.    Health Coaches help you identify goals that will work best for you. Health Coaches provide support and encouragement with overcoming barriers and help you to find inspiration and motivation to lead a healthy lifestyle.    Option one:  Health Coaching 3-Pack; Three, 30-minute Health Coaching Visits, for $99  Visits are done virtually (phone or video)  This is a self pay service; we do not accept insurance for isidro coaching.    Option two:   The 24 week Plan; 11 Health Coaching Visits, and a 7 months subscription to [employer]-- on-demand fitness, nutrition and mindfulness classes, for $499 (employee discounts may be available). Participants will also meet regularly with a weight management Medical Provider and a Registered/Licensed Dietician.  This is a self-pay service; we do not accept insurance for health coaching.    To Schedule a free Health  Q&A appointment to learn more,  call " "[phone].  ____________________________________________________________________  [facility]  Healthy Lifestyle Group    Healthy Lifestyle Group  This is a 60 minute virtual coaching group for those who want to lead a healthier lifestyle. Come together to set goals and overcome barriers in a supportive group environment. We will address the four pillars of health--nutrition, exercise, sleep and emotional well-being.  This group is highly recommended for those who are participating in the 24 week Healthy Lifestyle Plan and our Health Coaching sessions.    WHEN: This group meets the first Friday of the month, 12:30 PM - 1:30 PM online, via a zoom meeting.      FACILITATOR: Led by National Board Certified Health and , Destinee Guajardo [facility].    TO REGISTER: Please call the Call Center at [phone] to register. You will get an appointment to attend in [facility]. Fifteen minutes prior to the meeting, complete the e-check in and you will get the link to join the meeting.  There is no charge to attend this group and space is limited.      2023 and 2024 Meeting Topics and Dates:    November 3: Introduction to Mindfulness (Learn simple and effective mindfulness practices and how it can benefit you)    December 8: Let's Talk (guided discussion on our wins and challenges)    January 5: New Years Vision: Manifest your Best 2024! (Guided imagery,  journaling and discussion)    February 2: Let's Talk    March 1: 10 Percent Happier by Alton Mart (Book Bites; a guided discussion on the nuggets of wisdom from favorite wellness books; no need to read the book but highly encouraged)    April 5: Let's Talk    May 3: \"Essentialism; The Disciplined Pursuit of Less by Tian Tan (book bites discussion)    June 7: Let's Talk    July 5: NO MEETING, off for the 4th of July Holiday    August 2: The Blue Zones, Secrets for Living a Longer Life by Alton Shepherd (book bites " discussion)      If you would like bariatric surgery specific support group info please let your care team know.         Thank you,   [facility] Comprehensive Weight Management Team            WEGOVY (semaglutide)    What is Wegovy?    Wegovy (semaglutide) injection 2.4 mg is an injectable prescription medicine used for adults with obesity (BMI ?30) or overweight (excess weight) (BMI ?27) who also have weight-related medical problems to help them lose weight and keep the weight off.    1.  Start Wegovy (semaglutide) 0.25 mg once weekly for 4 weeks, then if tolerating increase to 0.5 mg weekly for 4 weeks, then if tolerating increase to 1 mg weekly for 4 weeks, then if tolerating increase to 1.7 mg weekly for 4 weeks, then if tolerating increase to 2.4 mg weekly thereafter.    -Each Wegovy pen is a once weekly single-dose prefilled pen with a pen injector already built within the pen. Discard the Wegovy pen after use in sharps container.     2. Storage: make sure that when you get the prescription that you store the prescription in the refrigerator until it is time to use the Wegovy pen.  Once it is time to use the Wegovy pen, you can keep the pen at room temperature and it is good for up to 28 days at room temperature.     3.  Potential common side effects: nausea, headache, diarrhea, stomach upset.  If these become unmanageable or concerning symptoms, please make sure to call or mychart.    4. Wegovy should be discontinued for ?2 months prior to becoming pregnant.     Prior Authorization Process for Weight Management Medications: You are being prescribed a medication that will likely need to undergo a prior authorization.  A prior authorization is when the clinic needs to fill out a form that is sent to your insurance company to obtain coverage for that medication. The prescription will NOT automatically go to your pharmacy today if it needs a Prior Authorization. If the medication prior authorization is  approved, the care team will contact you and the prescription will be released to your pharmacy. If denied, we will work to try and appeal the prior authorization if possible. The initial prior authorization process takes up to 5-10 business days and appeals can take up to 30 days. If you do not hear from us at the end of that time, you may contact the clinic.    Go to site: [email or web address] to learn more and watch instruction videos.    For any questions or concerns please send a [employer] message to our team or call our weight management call center at [phone] during regular business hours. For questions during evenings or weekends your messages will be addressed during the next business day.  For emergencies please call 911 or seek immediate medical care.

## 2024-10-03 NOTE — ASSESSMENT & PLAN NOTE
Last seen 1 year ago.  Was very nervous to start Saxenda.  She just darted Saxenda 1 month ago.  Currently on day 4 of 1.8 mg dose.  She is having side effects of nausea daily after her injection.  Constipation has gotten worse, but has improved with MiraLAX.  She does not think it has been helpful with hunger or weight.  Due to side effects we will transition to Wegovy today.  She will start Wegovy 1 day after her last injection of Saxenda.

## 2024-10-18 ENCOUNTER — OFFICE VISIT (OUTPATIENT)
Dept: OPTOMETRY | Facility: CLINIC | Age: 34
End: 2024-10-18
Payer: COMMERCIAL

## 2024-10-18 DIAGNOSIS — H17.9 BILATERAL CORNEAL SCARS: Primary | ICD-10-CM

## 2024-10-18 DIAGNOSIS — H52.223 REGULAR ASTIGMATISM OF BOTH EYES: ICD-10-CM

## 2024-10-18 PROCEDURE — 92004 COMPRE OPH EXAM NEW PT 1/>: CPT | Performed by: OPTOMETRIST

## 2024-10-18 PROCEDURE — 92015 DETERMINE REFRACTIVE STATE: CPT | Performed by: OPTOMETRIST

## 2024-10-18 ASSESSMENT — VISUAL ACUITY
OS_SC+: -2
OS_SC: 20/20
OD_SC: 20/20
METHOD: SNELLEN - LINEAR
OD_SC+: -1
OS_SC: 20/30
OD_SC: 20/20

## 2024-10-18 ASSESSMENT — KERATOMETRY
OD_K2POWER_DIOPTERS: 39.50
OS_AXISANGLE2_DEGREES: 2
OS_K2POWER_DIOPTERS: 41.00
OD_AXISANGLE2_DEGREES: 16
OD_K1POWER_DIOPTERS: 40.00
OS_K1POWER_DIOPTERS: 40.50

## 2024-10-18 ASSESSMENT — CONF VISUAL FIELD
OD_SUPERIOR_TEMPORAL_RESTRICTION: 0
METHOD: COUNTING FINGERS
OD_INFERIOR_NASAL_RESTRICTION: 0
OS_NORMAL: 1
OS_INFERIOR_TEMPORAL_RESTRICTION: 0
OD_NORMAL: 1
OS_INFERIOR_NASAL_RESTRICTION: 0
OS_SUPERIOR_TEMPORAL_RESTRICTION: 0
OD_SUPERIOR_NASAL_RESTRICTION: 0
OS_SUPERIOR_NASAL_RESTRICTION: 0
OD_INFERIOR_TEMPORAL_RESTRICTION: 0

## 2024-10-18 ASSESSMENT — REFRACTION_MANIFEST
OS_CYLINDER: +1.25
OD_SPHERE: -1.25
OS_CYLINDER: SPHERE
OD_SPHERE: -0.75
OS_AXIS: 073
METHOD_AUTOREFRACTION: 1
OD_CYLINDER: +1.25
OS_SPHERE: -0.50
OD_AXIS: 005
OD_AXIS: 005
OS_SPHERE: -3.00
OD_CYLINDER: +0.75

## 2024-10-18 ASSESSMENT — CUP TO DISC RATIO
OS_RATIO: 0.3
OD_RATIO: 0.3

## 2024-10-18 ASSESSMENT — TONOMETRY
OD_IOP_MMHG: 19
OS_IOP_MMHG: 19
IOP_METHOD: APPLANATION

## 2024-10-18 ASSESSMENT — EXTERNAL EXAM - RIGHT EYE: OD_EXAM: NORMAL

## 2024-10-18 ASSESSMENT — SLIT LAMP EXAM - LIDS
COMMENTS: NORMAL
COMMENTS: NORMAL

## 2024-10-18 ASSESSMENT — EXTERNAL EXAM - LEFT EYE: OS_EXAM: NORMAL

## 2024-10-18 NOTE — PROGRESS NOTES
Chief Complaint   Patient presents with    COMPREHENSIVE EYE EXAM         Last Eye Exam: 2010, possibly   Dilated Previously: Yes    What are you currently using to see?  does not use glasses or contacts       Distance Vision Acuity: Satisfied with vision    Near Vision Acuity: Not satisfied, trouble seeing the screen etc...    Eye Comfort: dry at times   Do you use eye drops? : No  Occupation or Hobbies: PCA, Student     Kimberly Delong Optometric Assistant           Medical, surgical and family histories reviewed and updated 10/18/2024.       OBJECTIVE: See Ophthalmology exam    ASSESSMENT:    ICD-10-CM    1. 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 Regular astigmatism of both eyes  H52.223           PLAN:     Patient Instructions   Astigmatism results from curvature differential in the cornea and crystalline lens which can cause a distorted image, as light rays are prevented from meeting at a common focus.    Eyeglass prescription given.      Sonia Demarco O.D.  [facility]  [address] [place], [place] [id number] [phone]

## 2024-10-18 NOTE — PATIENT INSTRUCTIONS
Astigmatism results from curvature differential in the cornea and crystalline lens which can cause a distorted image, as light rays are prevented from meeting at a common focus.    Eyeglass prescription given.      Sonia Demarco O.D.  [facility]  [address] [place] [address] [phone]

## 2024-10-18 NOTE — LETTER
10/18/2024      Dax Beckman  [address]  [place] [address]      Dear Colleague,    Thank you for referring your patient, Dax Beckman, to the [facility]. Please see a copy of my visit note below.    Chief Complaint   Patient presents with     COMPREHENSIVE EYE EXAM         Last Eye Exam: 2010, possibly   Dilated Previously: Yes    What are you currently using to see?  does not use glasses or contacts       Distance Vision Acuity: Satisfied with vision    Near Vision Acuity: Not satisfied, trouble seeing the screen etc...    Eye Comfort: dry at times   Do you use eye drops? : No  Occupation or Hobbies: PCA, Student     [employer] Optometric Assistant           Medical, surgical and family histories reviewed and updated 10/18/2024.       OBJECTIVE: See Ophthalmology exam    ASSESSMENT:    ICD-10-CM    1. 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 Regular astigmatism of both eyes  H52.223           PLAN:     Patient Instructions   Astigmatism results from curvature differential in the cornea and crystalline lens which can cause a distorted image, as light rays are prevented from meeting at a common focus.    Eyeglass prescription given.      Sonia Demarco O.D.  [facility]  [address] [place], [place] [id number] [phone]         Again, thank you for allowing me to participate in the care of your patient.        Sincerely,        Sonia Demarco OD

## 2024-10-23 ENCOUNTER — MYC MEDICAL ADVICE (OUTPATIENT)
Dept: ORTHOPEDICS | Facility: CLINIC | Age: 34
End: 2024-10-23

## 2024-10-23 ENCOUNTER — OFFICE VISIT (OUTPATIENT)
Dept: URGENT CARE | Facility: URGENT CARE | Age: 34
End: 2024-10-23
Payer: COMMERCIAL

## 2024-10-23 ENCOUNTER — APPOINTMENT (OUTPATIENT)
Dept: OPTOMETRY | Facility: CLINIC | Age: 34
End: 2024-10-23
Payer: COMMERCIAL

## 2024-10-23 VITALS
WEIGHT: 208.6 LBS | HEART RATE: 73 BPM | TEMPERATURE: 97.9 F | RESPIRATION RATE: 17 BRPM | SYSTOLIC BLOOD PRESSURE: 118 MMHG | DIASTOLIC BLOOD PRESSURE: 75 MMHG | BODY MASS INDEX: 34.17 KG/M2 | OXYGEN SATURATION: 99 %

## 2024-10-23 DIAGNOSIS — N94.9 VAGINAL DISCOMFORT: Primary | ICD-10-CM

## 2024-10-23 LAB
ALBUMIN UR-MCNC: NEGATIVE MG/DL
APPEARANCE UR: CLEAR
BILIRUB UR QL STRIP: ABNORMAL
CLUE CELLS: ABNORMAL
COLOR UR AUTO: YELLOW
GLUCOSE UR STRIP-MCNC: NEGATIVE MG/DL
HGB UR QL STRIP: NEGATIVE
KETONES UR STRIP-MCNC: NEGATIVE MG/DL
LEUKOCYTE ESTERASE UR QL STRIP: NEGATIVE
NITRATE UR QL: NEGATIVE
PH UR STRIP: 6 [PH] (ref 5–7)
SP GR UR STRIP: 1.02 (ref 1–1.03)
TRICHOMONAS, WET PREP: ABNORMAL
UROBILINOGEN UR STRIP-ACNC: 0.2 E.U./DL
WBC'S/HIGH POWER FIELD, WET PREP: ABNORMAL
YEAST, WET PREP: ABNORMAL

## 2024-10-23 PROCEDURE — 87210 SMEAR WET MOUNT SALINE/INK: CPT | Performed by: STUDENT IN AN ORGANIZED HEALTH CARE EDUCATION/TRAINING PROGRAM

## 2024-10-23 PROCEDURE — 81003 URINALYSIS AUTO W/O SCOPE: CPT | Performed by: STUDENT IN AN ORGANIZED HEALTH CARE EDUCATION/TRAINING PROGRAM

## 2024-10-23 PROCEDURE — 99213 OFFICE O/P EST LOW 20 MIN: CPT | Performed by: STUDENT IN AN ORGANIZED HEALTH CARE EDUCATION/TRAINING PROGRAM

## 2024-10-23 PROCEDURE — 92340 FIT SPECTACLES MONOFOCAL: CPT | Performed by: OPTOMETRIST

## 2024-10-23 NOTE — PROGRESS NOTES
Assessment & Plan     Vaginal discomfort  UA and wet prep unremarkable for infection. They recently moved and I suspect she was not hydrating as well and the symptoms of very slight burning with urination. Encouraged to increase fluid intake and monitor symptoms. Follow up if symptoms persist or worsen.    - UA Macroscopic with reflex to Microscopic and Culture - Lab Collect  - Wet prep - lab collect  - UA Macroscopic with reflex to Microscopic and Culture - Lab Collect  - Wet prep - lab collect         No follow-ups on file.    RAKEL Chang [facility]    Amanda Verduzco is a 34 year old female who presents to clinic today for the following health issues:  Chief Complaint   Patient presents with    UTI     Itching, frequency, slight burning urinating. Sx 4-5 days.        HPI    Review of Systems  Constitutional, HEENT, cardiovascular, pulmonary, GI, , musculoskeletal, neuro, skin, endocrine and psych systems are negative, except as otherwise noted.      Objective    /75 (BP Location: Left arm, Cuff Size: Adult Regular)   Pulse 73   Temp 97.9  F (36.6  C) (Tympanic)   Resp 17   Wt 94.6 kg (208 lb 9.6 oz)   SpO2 99%   BMI 34.17 kg/m    Physical Exam   GENERAL: alert and no distress  ABDOMEN: soft, nontender, no hepatosplenomegaly, no masses and bowel sounds normal  MS: no gross musculoskeletal defects noted, no edema  SKIN: no suspicious lesions or rashes  NEURO: Normal strength and tone, mentation intact and speech normal    Results for orders placed or performed in visit on 10/23/24 (from the past 24 hours)   UA Macroscopic with reflex to Microscopic and Culture - Lab Collect    Specimen: Urine, Clean Catch   Result Value Ref Range    Color Urine Yellow Colorless, Straw, Light Yellow, Yellow    Appearance Urine Clear Clear    Glucose Urine Negative Negative mg/dL    Bilirubin Urine Small (A) Negative    Ketones Urine Negative Negative mg/dL    Specific  Gravity Urine 1.020 1.003 - 1.035    Blood Urine Negative Negative    pH Urine 6.0 5.0 - 7.0    Protein Albumin Urine Negative Negative mg/dL    Urobilinogen Urine 0.2 0.2, 1.0 E.U./dL    Nitrite Urine Negative Negative    Leukocyte Esterase Urine Negative Negative    Narrative    Microscopic not indicated   Wet prep - lab collect    Specimen: Vagina; Swab   Result Value Ref Range    Trichomonas Absent Absent    Yeast Absent Absent    Clue Cells Absent Absent    WBCs/high power field 2+ (A) None

## 2024-10-23 NOTE — TELEPHONE ENCOUNTER
"Discharge Instructions for COVID-19 Patients  You have--or may have--COVID-19. Please follow the instructions listed below.   If you have a weakened immune system, discuss with your doctor any other actions you need to take.  How can I protect others?  If you have symptoms (fever, cough, body aches or trouble breathing):    Stay home and away from others (self-isolate) until:  ? Your other symptoms have resolved (gotten better). And   ? You've had no fever--and no medicine that reduces fever--for 1 full day (24 hours). And   ? At least 10 days have passed since your symptoms started. (You may need to wait 20 days. Follow the advice of your care team.)  If you don't show symptoms, but testing showed that you have COVID-19:    Stay home and away from others (self-isolate) until at least 10 days have passed since the date of your first positive COVID-19 test.  During this time    Stay in your own room, even for meals. Use your own bathroom if you can.    Stay away from others in your home. No hugging, kissing or shaking hands. No visitors.    Don't go to work, school or anywhere else.    Clean \"high touch\" surfaces often (doorknobs, counters, handles). Use household cleaning spray or wipes.    You'll find a full list of  on the EPA website: www.epa.gov/pesticide-registration/list-n-disinfectants-use-against-sars-cov-2.    Cover your mouth and nose with a mask or other face covering to avoid spreading germs.    Wash your hands and face often. Use soap and water.    Caregivers in these groups are at risk for severe illness due to COVID-19:  ? People 65 years and older  ? People who live in a nursing home or long-term care facility  ? People with chronic disease (lung, heart, cancer, diabetes, kidney, liver, immunologic)  ? People who have a weakened immune system, including those who:    Are in cancer treatment    Take medicine that weakens the immune system, such as corticosteroids    Had a bone marrow or organ " Left Voicemail (1st Attempt) and Sent Mychart (1st Attempt) for the patient to call back and schedule the following:    Appointment type: return [facility]  Provider: Opal Callaway     transplant    Have an immune deficiency    Have poorly controlled HIV or AIDS    Are obese (body mass index of 40 or higher)    Smoke regularly    Caregivers should wear gloves while washing dishes, handling laundry and cleaning bedrooms and bathrooms.    Use caution when washing and drying laundry: Don't shake dirty laundry and use the warmest water setting that you can.    For more tips on managing your health at home, go to www.cdc.gov/coronavirus/2019-ncov/downloads/10Things.pdf.  How can I take care of myself at home?  1. Get lots of rest. Drink extra fluids (unless a doctor has told you not to).  2. Take Tylenol (acetaminophen) for fever or pain. If you have liver or kidney problems, ask your family doctor if it's okay to take Tylenol.   Adults can take either:   ? 650 mg (two 325 mg pills) every 4 to 6 hours, or   ? 1,000 mg (two 500 mg pills) every 8 hours as needed.  ? Note: Don't take more than 3,000 mg in one day. Acetaminophen is found in many medicines (both prescribed and over-the-counter medicines). Read all labels to be sure you don't take too much.   For children, check the Tylenol bottle for the right dose. The dose is based on the child's age or weight.  3. If you have other health problems (like cancer, heart failure, an organ transplant or severe kidney disease): Call your specialty clinic if you don't feel better in the next 2 days.  4. Know when to call 911. Emergency warning signs include:  ? Trouble breathing or shortness of breath  ? Pain or pressure in the chest that doesn't go away  ? Feeling confused like you haven't felt before, or not being able to wake up  ? Bluish-colored lips or face  5. Your doctor may have prescribed a blood thinner medicine. Follow their instructions.  Where can I get more information?     [facility] - About COVID-19:   [email or web address]    CDC - What to Do If You're Sick:  www.cdc.gov/coronavirus/2019-ncov/about/steps-when-sick.html    CDC - Ending Home Isolation: www.cdc.gov/coronavirus/2019-ncov/hcp/disposition-in-home-patients.html    CDC - Caring for Someone: www.cdc.gov/coronavirus/2019-ncov/if-you-are-sick/care-for-someone.html    [facility] - Interim Guidance for Hospital Discharge to Home: www.health.[facility].[place]./diseases/coronavirus/hcp/hospdischarge.pdf    Below are the COVID-19 hotlines at the [place] Department of Health ([facility]). Interpreters are available.  ? For health questions: Call 155-944-6292 or 1-512.274.1187 (7 a.m. to 7 p.m.)  ? For questions about schools and childcare: Call 401-105-5705 or 1-864.323.1855 (7 a.m. to 7 p.m.)    For informational purposes only. Not to replace the advice of your health care provider. Clinically reviewed by Dr. Jack Montenegro.   Copyright   2020 [facility]. All rights reserved. [employer] 860836 - REV 01/05/21.        Additional Information    Negative: SEVERE difficulty breathing (e.g., struggling for each breath, speaks in single words)    Negative: Difficult to awaken or acting confused (e.g., disoriented, slurred speech)    Negative: Bluish (or gray) lips or face now    Negative: Shock suspected (e.g., cold/pale/clammy skin, too weak to stand, low BP, rapid pulse)    Negative: Sounds like a life-threatening emergency to the triager    Negative: [1] COVID-19 exposure AND [2] no symptoms    Negative: COVID-19 vaccine reaction suspected (e.g., fever, headache, muscle aches) occurring 1 to 3 days after getting vaccine    Negative: COVID-19 vaccine, questions about    Negative: [1] Lives with someone known to have influenza (flu test positive) AND [2] flu-like symptoms (e.g., cough, runny nose, sore throat, SOB; with or without fever)    Negative: [1] Adult with possible COVID-19 symptoms AND [2] triager concerned about severity of symptoms or other causes    Negative: COVID-19 and breastfeeding, questions about    Negative:  "SEVERE or constant chest pain or pressure (Exception: mild central chest pain, present only when coughing)    Negative: MODERATE difficulty breathing (e.g., speaks in phrases, SOB even at rest, pulse 100-120)    Negative: [1] Headache AND [2] stiff neck (can't touch chin to chest)    Negative: MILD difficulty breathing (e.g., minimal/no SOB at rest, SOB with walking, pulse <100)    Negative: Chest pain or pressure    Negative: Patient sounds very sick or weak to the triager    Negative: Fever > 103 F (39.4 C)    Negative: [1] Fever > 101 F (38.3 C) AND [2] age > 60 years    Negative: [1] Fever > 100.0 F (37.8 C) AND [2] bedridden (e.g., nursing home patient, CVA, chronic illness, recovering from surgery)    Negative: HIGH RISK for severe COVID complications (e.g., age > 64 years, obesity with BMI > 25, pregnant, chronic lung disease or other chronic medical condition)  (Exception: Already seen by PCP and no new or worsening symptoms.)    Negative: [1] HIGH RISK patient AND [2] influenza is widespread in the community AND [3] ONE OR MORE respiratory symptoms: cough, sore throat, runny or stuffy nose    Negative: [1] HIGH RISK patient AND [2] influenza exposure within the last 7 days AND [3] ONE OR MORE respiratory symptoms: cough, sore throat, runny or stuffy nose    Negative: [1] COVID-19 infection suspected by caller or triager AND [2] mild symptoms (cough, fever, or others) AND [3] negative COVID-19 rapid test    Negative: Fever present > 3 days (72 hours)    Negative: [1] Fever returns after gone for over 24 hours AND [2] symptoms worse or not improved    Negative: [1] Continuous (nonstop) coughing interferes with work or school AND [2] no improvement using cough treatment per protocol    Answer Assessment - Initial Assessment Questions  1. COVID-19 DIAGNOSIS: \"Who made your Coronavirus (COVID-19) diagnosis?\" \"Was it confirmed by a positive lab test?\" If not diagnosed by a HCP, ask \"Are there lots of cases " "(community spread) where you live?\" (See public health department website, if unsure)      Not confirmed  2. COVID-19 EXPOSURE: \"Was there any known exposure to COVID before the symptoms began?\" CDC Definition of close contact: within 6 feet (2 meters) for a total of 15 minutes or more over a 24-hour period.       Yes, roommate  3. ONSET: \"When did the COVID-19 symptoms start?\"       thursday  4. WORST SYMPTOM: \"What is your worst symptom?\" (e.g., cough, fever, shortness of breath, muscle aches)      Diarrhea, headache  5. COUGH: \"Do you have a cough?\" If Yes, ask: \"How bad is the cough?\"        occasional  6. FEVER: \"Do you have a fever?\" If Yes, ask: \"What is your temperature, how was it measured, and when did it start?\"      no  7. RESPIRATORY STATUS: \"Describe your breathing?\" (e.g., shortness of breath, wheezing, unable to speak)       No issues  8. BETTER-SAME-WORSE: \"Are you getting better, staying the same or getting worse compared to yesterday?\"  If getting worse, ask, \"In what way?\"      same  9. HIGH RISK DISEASE: \"Do you have any chronic medical problems?\" (e.g., asthma, heart or lung disease, weak immune system, obesity, etc.)      Heart murmur, cancer in remission- not currently on chemo  10. PREGNANCY: \"Is there any chance you are pregnant?\" \"When was your last menstrual period?\"        no  11. OTHER SYMPTOMS: \"Do you have any other symptoms?\"  (e.g., chills, fatigue, headache, loss of smell or taste, muscle pain, sore throat; new loss of smell or taste especially support the diagnosis of COVID-19)        Sore throat, headache, diarrhea    Protocols used: CORONAVIRUS (COVID-19) DIAGNOSED OR [id number] 8.25.2021      "

## 2024-11-16 ENCOUNTER — OFFICE VISIT (OUTPATIENT)
Dept: URGENT CARE | Facility: URGENT CARE | Age: 34
End: 2024-11-16
Payer: COMMERCIAL

## 2024-11-16 VITALS
BODY MASS INDEX: 34.07 KG/M2 | SYSTOLIC BLOOD PRESSURE: 115 MMHG | RESPIRATION RATE: 14 BRPM | WEIGHT: 208 LBS | DIASTOLIC BLOOD PRESSURE: 83 MMHG | TEMPERATURE: 98.2 F | HEART RATE: 96 BPM | OXYGEN SATURATION: 97 %

## 2024-11-16 DIAGNOSIS — Z23 NEED FOR VACCINATION: ICD-10-CM

## 2024-11-16 DIAGNOSIS — S61.239A PUNCTURE WOUND OF FINGER OF LEFT HAND, INITIAL ENCOUNTER: Primary | ICD-10-CM

## 2024-11-16 PROCEDURE — 90715 TDAP VACCINE 7 YRS/> IM: CPT | Performed by: NURSE PRACTITIONER

## 2024-11-16 PROCEDURE — 90471 IMMUNIZATION ADMIN: CPT | Performed by: NURSE PRACTITIONER

## 2024-11-16 PROCEDURE — 99213 OFFICE O/P EST LOW 20 MIN: CPT | Mod: 25 | Performed by: NURSE PRACTITIONER

## 2024-11-16 NOTE — PROGRESS NOTES
Assessment & Plan     Puncture wound of finger of left hand, initial encounter      Need for vaccination       Tdap administered. Watch closely for worsening symptoms of infection including fever, chills, redness, swelling, pain, purulent discharge and follow-up right away if develops.     Follow-up with PCP if symptoms persist for 7 days, and sooner if symptoms worsen or new symptoms develop.     Discussed red flag symptoms which warrant immediate visit in emergency room    All questions were answered and patient verbalized understanding. AVS reviewed with patient.     Reena Lundy, DNP, APRN, CNP 11/16/2024 6:40 PM  [facility] URGENT CARE BLOSSOM Verduzco is a 34 year old female who presents to clinic today for the following health issues:  Chief Complaint   Patient presents with    Urgent Care     Patient reports she fell on a fernando dirty nail piercing left 4th finger this evening.   Present for possible tetanus vaccine.        Patient presents for evaluation of finger injury. She fell on a fernando nail yesterday piercing her left 4th finger. She is having work done in her home and tripped on the stairs. Wound bled and she washed it. It is tender with touch. Denies fever, purulent discharge, warmth. She wonders if she needs a tetanus vaccine, last Nov 2019.         Problem list, Medication list, Allergies, and Medical history reviewed in EPIC.    ROS:  Review of systems negative except for noted above        Objective    /83   Pulse 96   Temp 98.2  F (36.8  C) (Tympanic)   Resp 14   Wt 94.3 kg (208 lb)   SpO2 97%   BMI 34.07 kg/m    Physical Exam  Constitutional:       General: She is not in acute distress.     Appearance: She is not toxic-appearing or diaphoretic.   Cardiovascular:      Pulses: Normal pulses.   Musculoskeletal:      Left hand: Normal.   Skin:     General: Skin is warm and dry.      Comments: 1 mm healing puncture wound left 4th distal finger without abscess,  drainage, warmth, tenderness   Neurological:      Mental Status: She is alert.              Verbal consent obtained from patient to take photo for medical electronic health record

## 2024-11-17 NOTE — PROGRESS NOTES
Clinic Administered Medication Documentation        Patient was given Tdap. Prior to medication administration, verified patient's identity using patient s name and date of birth. Please see MAR and medication order for additional information. Patient instructed to     Vial/Syringe: Millicent Berg, Medical Assistant

## 2024-12-27 ENCOUNTER — TELEPHONE (OUTPATIENT)
Dept: ENDOCRINOLOGY | Facility: CLINIC | Age: 34
End: 2024-12-27
Payer: COMMERCIAL

## 2024-12-27 NOTE — TELEPHONE ENCOUNTER
[facility]  Fax:[phone]  Spec: [phone]  [place]: [phone]

## 2024-12-30 ENCOUNTER — OFFICE VISIT (OUTPATIENT)
Dept: URGENT CARE | Facility: URGENT CARE | Age: 34
End: 2024-12-30
Payer: COMMERCIAL

## 2024-12-30 VITALS
HEART RATE: 89 BPM | OXYGEN SATURATION: 97 % | BODY MASS INDEX: 32.89 KG/M2 | TEMPERATURE: 98.3 F | SYSTOLIC BLOOD PRESSURE: 107 MMHG | WEIGHT: 200.8 LBS | RESPIRATION RATE: 15 BRPM | DIASTOLIC BLOOD PRESSURE: 74 MMHG

## 2024-12-30 DIAGNOSIS — Z20.822 EXPOSURE TO 2019 NOVEL CORONAVIRUS: ICD-10-CM

## 2024-12-30 DIAGNOSIS — J10.1 INFLUENZA B: ICD-10-CM

## 2024-12-30 DIAGNOSIS — J02.9 SORE THROAT: ICD-10-CM

## 2024-12-30 DIAGNOSIS — R05.1 ACUTE COUGH: Primary | ICD-10-CM

## 2024-12-30 DIAGNOSIS — B34.9 VIRAL SYNDROME: ICD-10-CM

## 2024-12-30 LAB
DEPRECATED S PYO AG THROAT QL EIA: NEGATIVE
FLUAV AG SPEC QL IA: NEGATIVE
FLUBV AG SPEC QL IA: POSITIVE
S PYO DNA THROAT QL NAA+PROBE: NOT DETECTED

## 2024-12-30 PROCEDURE — 99213 OFFICE O/P EST LOW 20 MIN: CPT | Performed by: PHYSICIAN ASSISTANT

## 2024-12-30 PROCEDURE — 87804 INFLUENZA ASSAY W/OPTIC: CPT | Performed by: PHYSICIAN ASSISTANT

## 2024-12-30 PROCEDURE — 87651 STREP A DNA AMP PROBE: CPT | Performed by: PHYSICIAN ASSISTANT

## 2024-12-30 PROCEDURE — 87635 SARS-COV-2 COVID-19 AMP PRB: CPT | Performed by: PHYSICIAN ASSISTANT

## 2024-12-30 ASSESSMENT — ENCOUNTER SYMPTOMS
SORE THROAT: 1
RHINORRHEA: 1
MYALGIAS: 1
COUGH: 1

## 2024-12-30 NOTE — PROGRESS NOTES
SUBJECTIVE:   Dax Beckman is a 34 year old female presenting with a chief complaint of   Chief Complaint   Patient presents with     URI     Congestion, runny nose, fever, body aches, cough, sore throat  Cough & runny nose x3 days  Fever & body aches started yesterday  Just returned from [place]       She is an established patient of [facility].  Patient presents with 3 days of symptoms.    Treatment:  advil and dayquil     Review of Systems   HENT:  Positive for congestion, rhinorrhea and sore throat.    Respiratory:  Positive for cough.    Musculoskeletal:  Positive for myalgias.   All other systems reviewed and are negative.      No past medical history on file.  Family History   Problem Relation Age of Onset     Lipids Father      Heart Disease Father      Diabetes Father      Glaucoma No family hx of      Macular Degeneration No family hx of      Current Outpatient Medications   Medication Sig Dispense Refill     acetaminophen (TYLENOL) 325 MG tablet Take 1-2 tablets (325-650 mg) by mouth every 6 hours as needed for mild pain 100 tablet 1     levonorgestrel (MIRENA) 20 MCG/24HR IUD 1 each (20 mcg) by Intrauterine route continuous       ondansetron (ZOFRAN ODT) 4 MG ODT tab Take 1 tablet (4 mg) by mouth every 8 hours as needed for nausea 15 tablet 0     polyethylene glycol (MIRALAX) 17 GM/Dose powder Take 17 g by mouth daily. 850 g 3     Semaglutide-Weight Management (WEGOVY) 1 MG/0.5ML pen Inject 1 mg subcutaneously once a week. After completing for weeks of 0.5mg dose 2 mL 2     Semaglutide-Weight Management (WEGOVY) 1.7 MG/0.75ML pen Inject 1.7 mg subcutaneously once a week. (Patient not taking: Reported on 12/30/2024) 3 mL 2     Social History     Tobacco Use     Smoking status: Never     Smokeless tobacco: Never   Substance Use Topics     Alcohol use: No       OBJECTIVE  /74   Pulse 89   Temp 98.3  F (36.8  C) (Oral)   Resp 15   Wt 91.1 kg (200 lb 12.8 oz)   SpO2 97%   BMI 32.89 kg/m       Physical Exam  Vitals and nursing note reviewed.   Constitutional:       Appearance: Normal appearance. She is obese.   HENT:      Head: Normocephalic and atraumatic.      Right Ear: Tympanic membrane, ear canal and external ear normal.      Left Ear: Tympanic membrane, ear canal and external ear normal.      Nose: Nose normal.      Mouth/Throat:      Mouth: Mucous membranes are moist.      Pharynx: Oropharynx is clear.   Eyes:      Extraocular Movements: Extraocular movements intact.      Conjunctiva/sclera: Conjunctivae normal.   Cardiovascular:      Rate and Rhythm: Normal rate and regular rhythm.      Pulses: Normal pulses.      Heart sounds: Normal heart sounds.   Pulmonary:      Effort: Pulmonary effort is normal.      Breath sounds: Normal breath sounds.   Musculoskeletal:      Cervical back: Normal range of motion.   Skin:     General: Skin is warm and dry.      Findings: No rash.   Neurological:      General: No focal deficit present.      Mental Status: She is alert.   Psychiatric:         Mood and Affect: Mood normal.         Behavior: Behavior normal.       Labs:  Results for orders placed or performed in visit on 12/30/24 (from the past 24 hours)   Influenza A & B Antigen - Clinic Collect    Specimen: Nose; Swab   Result Value Ref Range    Influenza A antigen Negative Negative    Influenza B antigen Positive (A) Negative    Narrative    Test results must be correlated with clinical data. If necessary, results should be confirmed by a molecular assay or viral culture.   Streptococcus A Rapid Screen w/Reflex to PCR - Clinic Collect    Specimen: Throat; Swab   Result Value Ref Range    Group A Strep antigen Negative Negative       ASSESSMENT:      ICD-10-CM    1. Acute cough  R05.1 Influenza A & B Antigen - Clinic Collect      2. Sore throat  J02.9 Streptococcus A Rapid Screen w/Reflex to PCR - Clinic Collect     Group A Streptococcus PCR Throat Swab      3. Exposure to 2019 novel coronavirus  Z20.822  COVID-19 Virus (Coronavirus) by PCR Nose      4. Viral syndrome  B34.9       5. Influenza B  J10.1            Medical Decision Making:    Differential Diagnosis:  URI Adult/Peds:  Bronchitis-viral, Influenza, Strep pharyngitis, Viral pharyngitis, Viral syndrome, and covid      Serious Comorbid Conditions:  Adult:   reviewed    PLAN:    Tylenol/motrin as needed.  Drink plenty of water.  Gargle with salt water.  May use chloraseptic spray as directed for ST.  Strep pcr pending.  Discussed and recommended CDC guidelines for self isolation.  COVID test pending.        Followup:    If not improving or if condition worsens, follow up with your Primary Care Provider, If not improving or if conditions worsens over the next 12-24 hours, go to the Emergency Department    There are no Patient Instructions on file for this visit.

## 2024-12-31 LAB — SARS-COV-2 RNA RESP QL NAA+PROBE: NEGATIVE

## 2024-12-31 NOTE — TELEPHONE ENCOUNTER
Insurance will only cover one fill of 1 mg dose every 365 days. Patient may increase to next dose up of 1.7 mg. Patient contacted with plan per [facility] via [employer] message to patient.

## 2025-01-27 ENCOUNTER — TELEPHONE (OUTPATIENT)
Dept: ENDOCRINOLOGY | Facility: CLINIC | Age: 35
End: 2025-01-27
Payer: COMMERCIAL

## 2025-01-27 NOTE — TELEPHONE ENCOUNTER
Left Voicemail (1st Attempt) and Sent Mychart (1st Attempt) for the patient to call back and schedule the following:    Appointment type: Return Weight Management  Appointment mode: In Person or Virtual Visit  Provider: Opal Callaway PA-C  Return date: Approx. Next available  Specialty phone number: [phone]    Additional Notes:   Reschedule 4/25/2025

## 2025-01-30 ENCOUNTER — TELEPHONE (OUTPATIENT)
Dept: ENDOCRINOLOGY | Facility: CLINIC | Age: 35
End: 2025-01-30
Payer: COMMERCIAL

## 2025-01-30 NOTE — TELEPHONE ENCOUNTER
Left Voicemail (2nd Attempt) and Sent Mychart (2nd Attempt) for the patient to call back and schedule the following:    Appointment type: Return Weight Management  Appointment mode: In Person or Virtual Visit  Provider: Opal Callaway PA-C  Return date: Approx. Next available  Specialty phone number: [phone]    Additional Notes:   Reschedule 4/25/25 visit with Cesia

## 2025-02-03 ENCOUNTER — MYC MEDICAL ADVICE (OUTPATIENT)
Dept: PHARMACY | Facility: CLINIC | Age: 35
End: 2025-02-03
Payer: COMMERCIAL

## 2025-02-03 ENCOUNTER — VIRTUAL VISIT (OUTPATIENT)
Dept: PHARMACY | Facility: CLINIC | Age: 35
End: 2025-02-03
Payer: COMMERCIAL

## 2025-02-03 VITALS — BODY MASS INDEX: 32.91 KG/M2 | HEIGHT: 66 IN

## 2025-02-03 DIAGNOSIS — E66.9 OBESITY: ICD-10-CM

## 2025-02-03 DIAGNOSIS — E66.811 CLASS 1 OBESITY WITH SERIOUS COMORBIDITY AND BODY MASS INDEX (BMI) OF 32.0 TO 32.9 IN ADULT, UNSPECIFIED OBESITY TYPE: Primary | ICD-10-CM

## 2025-02-03 DIAGNOSIS — R73.03 PREDIABETES: ICD-10-CM

## 2025-02-03 DIAGNOSIS — K59.03 DRUG INDUCED CONSTIPATION: ICD-10-CM

## 2025-02-03 DIAGNOSIS — Z78.9 TAKES DIETARY SUPPLEMENTS: ICD-10-CM

## 2025-02-03 ASSESSMENT — PAIN SCALES - GENERAL: PAINLEVEL_OUTOF10: NO PAIN (0)

## 2025-02-03 NOTE — NURSING NOTE
Current patient location: [address] [place] [address] [place] [address]    Is the patient currently in the state of [place]? YES    Visit mode: TELEPHONE    If the visit is dropped, the patient can be reconnected by:TELEPHONE VISIT: Phone number:   Telephone Information:   Mobile [phone]       Will anyone else be joining the visit? NO  (If patient encounters technical issues they should call [phone]    Are changes needed to the allergy or medication list? No    Are refills needed on medications prescribed by this physician? Discuss with provider    Rooming Documentation:  Questionnaire(s) not pre-assigned    Reason for visit: Medication Therapy Management    Pt states no weight available within last week.    Jeromy PIPERF

## 2025-02-03 NOTE — PATIENT INSTRUCTIONS
"Recommendations from today's MTM visit:                                                      Continue Wegovy 1.7 mg once weekly   Increase Miralax to 1 scoop daily   Increase water intake to 48-64 ounces daily   Try eating smaller meals or snacks more frequently the first few days after a Wegovy injection   Start taking vitamin D 5000 units once daily  Reschedule weight management appointment with Opal Callaway PA-C, Weight management clinic scheduling line: [phone]    Follow-up: 2 months with medication therapy management     It was great speaking with you today.  I value your experience and would be very thankful for your time in providing feedback in our clinic survey. In the next few days, you may receive an email or text message from [email or web address] with a link to a survey related to your  clinical pharmacist.\"     To schedule another MTM appointment, please call the clinic directly or you may call the MTM scheduling line at [phone].    My Clinical Pharmacist's contact information:                                                      Please feel free to contact me with any questions or concerns you have.      Elizabeth Simental, PharmD, AAHIVP  Medication Therapy Management Pharmacist     "

## 2025-02-03 NOTE — PROGRESS NOTES
Medication Therapy Management (MTM) Encounter    ASSESSMENT:                            Medication Adherence/Access: No issues identified.    Weight Management /pre-diabetes:  Weight loss progressing based on how clothing is fitting. She will check weight and send over mychart. Would benefit from increasing water intake and using Miralax daily for constipation. Eating something small more frequently may help the full feeling after using Wegovy. Zepbound is non-preferred and requires a prior auth. Can consider switch if side effects don't improve.     Supplements   Discussed taking vitamin D consistently     PLAN:                            Continue Wegovy 1.7 mg once weekly   Increase Miralax to 1 scoop daily   Increase water intake to 48-64 ounces daily   Try eating smaller meals or snacks more frequently the first few days after a Wegovy injection   Start taking vitamin D 5000 units once daily  Reschedule weight management appointment with Opal Callaway PA-C, Weight management clinic scheduling line: [phone]    Follow-up: 2 months with medication therapy management     SUBJECTIVE/OBJECTIVE:                          Dax Beckman is a 34 year old female seen for a follow-up visit.       Reason for visit: weight management follow-up.    Allergies/ADRs: Reviewed in chart  Past Medical History: Reviewed in chart  Tobacco: She reports that she has never smoked. She has never used smokeless tobacco.  Alcohol: not currently using  Medication Adherence/Access: some challenges remembering to take medications.     Weight Management /pre-diabetes/constipation:  Wegovy 1.7 mg once weekly     Supportive care   Miralax 17g once daily as needed for constipation   Ondansetron as needed for nausea    Following with Opal Callaway PA-C for medical weight management. Tolerating Wegovy okay. Some GI discomfort and burping for 2 days after an injection. Sometimes wakes up feeling full from the night before and has a hard  "time eating in the morning. Bowel movement every 3 days which was normal before starting Wegovy as well. Bowel movements can be hard. Using miralax as needed. No nausea. Has some excess skin that is bothersome. Would like to add in resistance training but struggles to find the time.     Nutrition/Eating Habits: 2 small meals per day with protein. Has cut down on sweets and salty snacks. Eating more greens. Occasional regular soda (1-2 times per week). At least 48 ounces of water per day.   Exercise/Activity: busy around the house and taking care of 3 kids.   Medications Tried/Failed:  Phentermine - irritation and abdominal pain   Saxenda- nausea and constipation, also ineffective    Initial Consult Weight: 215 lbs     Current weight today: 0 lbs 0 oz *no updated weight today. Clothes are fitting more loosely. Fitting in smaller pants size.   Cumulative Weight Loss: -15 lb, -7% from baseline    Wt Readings from Last 4 Encounters:   12/30/24 200 lb 12.8 oz (91.1 kg)   11/16/24 208 lb (94.3 kg)   10/23/24 208 lb 9.6 oz (94.6 kg)   10/01/24 215 lb (97.5 kg)     Estimated body mass index is 32.91 kg/m  as calculated from the following:    Height as of this encounter: 5' 5.5\" (1.664 m).    Weight as of 12/30/24: 200 lb 12.8 oz (91.1 kg).    Supplements   Vitamin D 5000 units once daily - doesn't take regularly       Today's Vitals: Ht 5' 5.5\" (1.664 m)   BMI 32.91 kg/m    ----------------    I spent 25 minutes with this patient today. All changes were made via collaborative practice agreement with Opal Callaway PA-C.     A summary of these recommendations was sent via [employer].      Telemedicine Visit Details  The patient's medications can be safely assessed via a telemedicine encounter.  Type of service:  Telephone visit  Originating Location (pt. Location): Home    Distant Location (provider location):  Off-site  Start Time:  1:05 pm  End Time:  1:30 pm     Medication Therapy Recommendations  Drug induced " constipation   1 Current Medication: polyethylene glycol (MIRALAX) 17 GM/Dose powder   Current Medication Sig: Take 17 g by mouth daily.   Rationale: Patient forgets to take - Adherence - Adherence   Recommendation: Increase Frequency   Status: Accepted per CPA   Identified Date: 2/3/2025 Completed Date: 2/3/2025

## 2025-02-04 ENCOUNTER — TELEPHONE (OUTPATIENT)
Dept: ENDOCRINOLOGY | Facility: CLINIC | Age: 35
End: 2025-02-04
Payer: COMMERCIAL

## 2025-02-04 NOTE — TELEPHONE ENCOUNTER
Patient confirmed scheduled appointment:     Date: 3/25/25  Time: 2PM  Visit type: Return Weight Management  Visit mode: Virtual Visit  Provider:  Opal Callaway PA-C  Location: [facility]    Additional Notes:

## 2025-02-24 ENCOUNTER — TELEPHONE (OUTPATIENT)
Dept: ENDOCRINOLOGY | Facility: CLINIC | Age: 35
End: 2025-02-24
Payer: COMMERCIAL

## 2025-02-24 NOTE — TELEPHONE ENCOUNTER
Medication Question or Refill    Contacts       Contact Date/Time Type Contact Phone/Fax    02/24/2025 12:18 PM CST Phone (Incoming) JustinejamesDax kuo (Self) [phone] (M)            What medication are you calling about (include dose and sig)?: Wegovy 1.7 mg    Preferred Pharmacy:       [facility] [address]   [address] [place] [facility] [address]  Phone: [phone] Fax: [phone]      Controlled Substance Agreement on file:   CSA -- Patient Level:    CSA: None found at the patient level.       Who prescribed the medication?: Opal Callaway    Do you need a refill? No    When did you use the medication last? Sunday 02/23/2025    Patient offered an appointment? No, Pt. Has an appointment.    Do you have any questions or concerns?  Yes: Pt. Would like to go off of the Wegovy during Ramadan that starts this coming Saturday, and would like to know if she needs to wean off of the drug. Please call her.      Could we send this information to you in [employer] or would you prefer to receive a phone call?:   Patient would prefer a phone call   Okay to leave a detailed message?: Yes at Cell number on file:    Telephone Information:   Mobile [phone]

## 2025-02-26 NOTE — TELEPHONE ENCOUNTER
Patient notified it is okay to stop the medication.  Instructed to send a message the week before she wants to restart and the restart dose will be determined by provider.  Patient is in agreement with the plan.

## 2025-03-09 ENCOUNTER — TELEPHONE (OUTPATIENT)
Dept: ENDOCRINOLOGY | Facility: CLINIC | Age: 35
End: 2025-03-09
Payer: COMMERCIAL

## 2025-03-09 DIAGNOSIS — E66.01 CLASS 2 SEVERE OBESITY WITH SERIOUS COMORBIDITY AND BODY MASS INDEX (BMI) OF 35.0 TO 35.9 IN ADULT, UNSPECIFIED OBESITY TYPE (H): Primary | ICD-10-CM

## 2025-03-09 DIAGNOSIS — E66.812 CLASS 2 SEVERE OBESITY WITH SERIOUS COMORBIDITY AND BODY MASS INDEX (BMI) OF 35.0 TO 35.9 IN ADULT, UNSPECIFIED OBESITY TYPE (H): Primary | ICD-10-CM

## 2025-03-09 NOTE — TELEPHONE ENCOUNTER
MTM referral placed due to [facility] Medication Therapy Management (MTM) practice shift. CPA with Opal Callaway PA-C.

## 2025-03-17 ENCOUNTER — TELEPHONE (OUTPATIENT)
Dept: ENDOCRINOLOGY | Facility: CLINIC | Age: 35
End: 2025-03-17
Payer: COMMERCIAL

## 2025-03-18 NOTE — TELEPHONE ENCOUNTER
PA Initiation    Medication: WEGOVY 1.7 MG/0.75ML SC SOAJ  Insurance Company: [employer] - Phone [phone] Fax [phone]  Pharmacy Filling the Rx:    Filling Pharmacy Phone:    Filling Pharmacy Fax:    Start Date: 3/17/2025    Key: [id number]

## 2025-03-19 NOTE — TELEPHONE ENCOUNTER
Prior Authorization Approval    Medication: WEGOVY 1.7 MG/0.75ML SC SOAJ  Authorization Effective Date: 3/18/2025  Authorization Expiration Date: 3/18/2026  Approved Dose/Quantity: uud  Reference #: Key: [id number]   Insurance Company: [employer] - Phone [phone] Fax [phone]  Expected CoPay: $    CoPay Card Available:      Financial Assistance Needed:    Which Pharmacy is filling the prescription:

## 2025-03-25 ENCOUNTER — VIRTUAL VISIT (OUTPATIENT)
Dept: ENDOCRINOLOGY | Facility: CLINIC | Age: 35
End: 2025-03-25
Payer: COMMERCIAL

## 2025-03-25 VITALS — HEIGHT: 66 IN | WEIGHT: 180 LBS | BODY MASS INDEX: 28.93 KG/M2

## 2025-03-25 DIAGNOSIS — E66.812 CLASS 2 SEVERE OBESITY WITH SERIOUS COMORBIDITY AND BODY MASS INDEX (BMI) OF 35.0 TO 35.9 IN ADULT, UNSPECIFIED OBESITY TYPE (H): ICD-10-CM

## 2025-03-25 DIAGNOSIS — E66.01 CLASS 2 SEVERE OBESITY WITH SERIOUS COMORBIDITY AND BODY MASS INDEX (BMI) OF 35.0 TO 35.9 IN ADULT, UNSPECIFIED OBESITY TYPE (H): ICD-10-CM

## 2025-03-25 PROCEDURE — 98005 SYNCH AUDIO-VIDEO EST LOW 20: CPT

## 2025-03-25 PROCEDURE — 1126F AMNT PAIN NOTED NONE PRSNT: CPT | Mod: 95

## 2025-03-25 ASSESSMENT — PAIN SCALES - GENERAL: PAINLEVEL_OUTOF10: NO PAIN (0)

## 2025-03-25 NOTE — NURSING NOTE
Is the patient currently in the state of [place]? YES    Location: home    Visit mode:VIDEO    If the visit is dropped, the patient can be reconnected by: VIDEO VISIT: Text to cell phone:   Telephone Information:   Mobile [phone]    and VIDEO VISIT: Send to e-mail at: [email or web address]    Will anyone else be joining the visit? NO  (If patient encounters technical issues they should call [phone]    Are changes needed to the allergy or medication list? No    Are refills needed on medications prescribed by this physician? NO    Reason for visit: JIHAN Brian, Virtual Visit Facilitator    QNR Status:  Pt completing on [employer].

## 2025-03-25 NOTE — PROGRESS NOTES
Virtual Visit Details    Type of service:  Video Visit   Video Start Time:  2:03PM  Video End Time: 2:24PM    Originating Location (pt. Location): Home    Distant Location (provider location):  Off-site  Platform used for Video Visit: [facility] Medical Weight Management Note     Dax Beckman  MRN:  [id number]  :  1990  TONY:  3/25/2025    Dear Georgina Gandhi MD,    I had the pleasure of seeing your patient Dax Beckman. She is a 34 year old female who I am continuing to see for treatment of obesity related to:        2023     9:06 AM   --   I have the following health issues associated with obesity None of the above   I have the following symptoms associated with obesity Depression    Back Pain    Fatigue    Irregular Menstral Cycle       Assessment & Plan   Problem List Items Addressed This Visit       Class 2 severe obesity with serious comorbidity and body mass index (BMI) of 35.0 to 35.9 in adult, unspecified obesity type (H)        Will continue to hold GLP-1 at this time and work on weight maintenance through life style changes. She will reach out if would like to restart GLP-1 in the future  Opal Esparza in 6 months or as needed         INTERVAL HISTORY:  New [facility]  Wegovy denied - needed to trial phentermine   Phentermine - side effects of anxiety   Wegovy appeal approved, but not able to start due to shortages.   Started Saxenda, delayed start due to concern of side effects. Started 2024.   Saxenda had side effects of nausea daily on 1.8mg dose, due to this transitioned to Wegovy       Clothes are fitting loser. Is really happy with her results.     Anti-obesity medication history    Current:   Wegovy 1.7mg - has been a better fit over the saxenda. Currently holding Wegovy for Ramadan. Last injection on .   Nausea and stomach cramping - first 3 days after injection. Constant, annoying. Was trying to figure out if caused by food choices.   Constipation - not taking  miralax consistently   Was starting to feel hungry by the end of the week. Does think that prior to holding was ready to dose increase.     Has not lost this much weight in many years. Is unsure if still losing weight.     Goal is to maintain weight. She would like to continue to hold Wegovy and work on maintenance through life style changes.       Recent diet changes: Eating 2 meals a day when not during ramadon. Minimal snacking. Currently only eating 1 meal a day. Typically drinking 48-64 oz of water daily.     Recent exercise/activity changes: active with children. Harder during Ramadan.     Recent stressors: no concerns     Recent sleep changes: stable       CURRENT WEIGHT:   180 lbs 0 oz    Initial Weight (lbs): 215 lbs  Last Visits Weight: 97.5 kg (215 lb)  Cumulative weight loss (lbs): 35  Weight Loss Percentage: 16.28%    Wt Readings from Last 5 Encounters:   03/25/25 81.6 kg (180 lb)   12/30/24 91.1 kg (200 lb 12.8 oz)   11/16/24 94.3 kg (208 lb)   10/23/24 94.6 kg (208 lb 9.6 oz)   10/01/24 97.5 kg (215 lb)             10/1/2024    10:20 AM   Changes and Difficulties   I have made the following changes to my diet since my last visit: September 10th I started the injections   With regards to my diet, I am still struggling with: Losing weight   I have made the following changes to my activity/exercise since my last visit: I try and get out and walk more. Life has been busy.   With regards to my activity/exercise, I am still struggling with: Going to the gym.         MEDICATIONS:   Current Outpatient Medications   Medication Sig Dispense Refill    acetaminophen (TYLENOL) 325 MG tablet Take 1-2 tablets (325-650 mg) by mouth every 6 hours as needed for mild pain 100 tablet 1    cholecalciferol (VITAMIN D3) 125 mcg (5000 units) capsule Take 125 mcg by mouth daily.      levonorgestrel (MIRENA) 20 MCG/24HR IUD 1 each (20 mcg) by Intrauterine route continuous      ondansetron (ZOFRAN ODT) 4 MG ODT tab Take 1  "tablet (4 mg) by mouth every 8 hours as needed for nausea (Patient not taking: Reported on 2/3/2025) 15 tablet 0    polyethylene glycol (MIRALAX) 17 GM/Dose powder Take 17 g by mouth daily. 850 g 3    Semaglutide-Weight Management (WEGOVY) 1.7 MG/0.75ML pen Inject 1.7 mg subcutaneously once a week. 3 mL 2           10/1/2024    10:20 AM   Weight Loss Medication History Reviewed With Patient   Which weight loss medications are you currently taking on a regular basis? Saxenda (liraglutide)   Are you having any side effects from the weight loss medication that we have prescribed you? Yes   If you are having side effects please describe: Chest burn. Some upset stomache               Objective    Ht 1.664 m (5' 5.51\")   Wt 81.6 kg (180 lb)   BMI 29.49 kg/m    Vitals - Patient Reported  Pain Score: No Pain (0)          PHYSICAL EXAM:  GENERAL: alert and no distress  EYES: Eyes grossly normal to inspection.  No discharge or erythema, or obvious scleral/conjunctival abnormalities.  RESP: No audible wheeze, cough, or visible cyanosis.    SKIN: Visible skin clear. No significant rash, abnormal pigmentation or lesions.  NEURO: Cranial nerves grossly intact.  Mentation and speech appropriate for age.  PSYCH: Appropriate affect, tone, and pace of words        Sincerely,    Opal Callaway PA-C      23 minutes spent by me on the date of the encounter doing chart review, history and exam, documentation and further activities per the note    The longitudinal plan of care for the diagnosis(es)/condition(s) as documented were addressed during this visit. Due to the added complexity in care, I will continue to support Dax in the subsequent management and with ongoing continuity of care.  "

## 2025-03-25 NOTE — LETTER
3/25/2025       RE: Dax Beckman  [address]  [place] [address]     Dear Colleague,    Thank you for referring your patient, Dax Beckman, to the [facility] [place] at [facility]. Please see a copy of my visit note below.    Virtual Visit Details    Type of service:  Video Visit   Video Start Time:  2:03PM  Video End Time: 2:24PM    Originating Location (pt. Location): Home    Distant Location (provider location):  Off-site  Platform used for Video Visit: [facility] Medical Weight Management Note     Dax Beckman  MRN:  [id number]  :  1990  TONY:  3/25/2025    Dear Georgina Gandhi MD,    I had the pleasure of seeing your patient Dax Beckman. She is a 34 year old female who I am continuing to see for treatment of obesity related to:        2023     9:06 AM   --   I have the following health issues associated with obesity None of the above   I have the following symptoms associated with obesity Depression    Back Pain    Fatigue    Irregular Menstral Cycle       Assessment & Plan  Problem List Items Addressed This Visit       Class 2 severe obesity with serious comorbidity and body mass index (BMI) of 35.0 to 35.9 in adult, unspecified obesity type (H)        Will continue to hold GLP-1 at this time and work on weight maintenance through life style changes. She will reach out if would like to restart GLP-1 in the future  Opal Esparza in 6 months or as needed         INTERVAL HISTORY:  New [facility] 23  Wegovy denied - needed to trial phentermine   Phentermine - side effects of anxiety   Wegovy appeal approved, but not able to start due to shortages.   Started Saxenda, delayed start due to concern of side effects. Started 2024.   Saxenda had side effects of nausea daily on 1.8mg dose, due to this transitioned to Wegovy       Clothes are fitting loser. Is really happy with her results.     Anti-obesity  medication history    Current:   Wegovy 1.7mg - has been a better fit over the saxenda. Currently holding Wegovy for Ramadan. Last injection on March 8th.   Nausea and stomach cramping - first 3 days after injection. Constant, annoying. Was trying to figure out if caused by food choices.   Constipation - not taking miralax consistently   Was starting to feel hungry by the end of the week. Does think that prior to holding was ready to dose increase.     Has not lost this much weight in many years. Is unsure if still losing weight.     Goal is to maintain weight. She would like to continue to hold Wegovy and work on maintenance through life style changes.       Recent diet changes: Eating 2 meals a day when not during ramadon. Minimal snacking. Currently only eating 1 meal a day. Typically drinking 48-64 oz of water daily.     Recent exercise/activity changes: active with children. Harder during Ramadan.     Recent stressors: no concerns     Recent sleep changes: stable       CURRENT WEIGHT:   180 lbs 0 oz    Initial Weight (lbs): 215 lbs  Last Visits Weight: 97.5 kg (215 lb)  Cumulative weight loss (lbs): 35  Weight Loss Percentage: 16.28%    Wt Readings from Last 5 Encounters:   03/25/25 81.6 kg (180 lb)   12/30/24 91.1 kg (200 lb 12.8 oz)   11/16/24 94.3 kg (208 lb)   10/23/24 94.6 kg (208 lb 9.6 oz)   10/01/24 97.5 kg (215 lb)             10/1/2024    10:20 AM   Changes and Difficulties   I have made the following changes to my diet since my last visit: September 10th I started the injections   With regards to my diet, I am still struggling with: Losing weight   I have made the following changes to my activity/exercise since my last visit: I try and get out and walk more. Life has been busy.   With regards to my activity/exercise, I am still struggling with: Going to the gym.         MEDICATIONS:   Current Outpatient Medications   Medication Sig Dispense Refill     acetaminophen (TYLENOL) 325 MG tablet Take 1-2  "tablets (325-650 mg) by mouth every 6 hours as needed for mild pain 100 tablet 1     cholecalciferol (VITAMIN D3) 125 mcg (5000 units) capsule Take 125 mcg by mouth daily.       levonorgestrel (MIRENA) 20 MCG/24HR IUD 1 each (20 mcg) by Intrauterine route continuous       ondansetron (ZOFRAN ODT) 4 MG ODT tab Take 1 tablet (4 mg) by mouth every 8 hours as needed for nausea (Patient not taking: Reported on 2/3/2025) 15 tablet 0     polyethylene glycol (MIRALAX) 17 GM/Dose powder Take 17 g by mouth daily. 850 g 3     Semaglutide-Weight Management (WEGOVY) 1.7 MG/0.75ML pen Inject 1.7 mg subcutaneously once a week. 3 mL 2           10/1/2024    10:20 AM   Weight Loss Medication History Reviewed With Patient   Which weight loss medications are you currently taking on a regular basis? Saxenda (liraglutide)   Are you having any side effects from the weight loss medication that we have prescribed you? Yes   If you are having side effects please describe: Chest burn. Some upset stomache               Objective   Ht 1.664 m (5' 5.51\")   Wt 81.6 kg (180 lb)   BMI 29.49 kg/m    Vitals - Patient Reported  Pain Score: No Pain (0)          PHYSICAL EXAM:  GENERAL: alert and no distress  EYES: Eyes grossly normal to inspection.  No discharge or erythema, or obvious scleral/conjunctival abnormalities.  RESP: No audible wheeze, cough, or visible cyanosis.    SKIN: Visible skin clear. No significant rash, abnormal pigmentation or lesions.  NEURO: Cranial nerves grossly intact.  Mentation and speech appropriate for age.  PSYCH: Appropriate affect, tone, and pace of words        Sincerely,    Opal Callaway PA-C      23 minutes spent by me on the date of the encounter doing chart review, history and exam, documentation and further activities per the note    The longitudinal plan of care for the diagnosis(es)/condition(s) as documented were addressed during this visit. Due to the added complexity in care, I will continue to support " Dax in the subsequent management and with ongoing continuity of care.      Again, thank you for allowing me to participate in the care of your patient.      Sincerely,    Opal Callaway PA-C

## 2025-04-01 NOTE — PATIENT INSTRUCTIONS
Visit Plan:     Will continue to hold GLP-1 at this time and work on weight maintenance through life style changes. She will reach out if would like to restart GLP-1 in the future  Opal Esparza in 6 months or as needed

## 2025-04-07 ENCOUNTER — TELEPHONE (OUTPATIENT)
Dept: ENDOCRINOLOGY | Facility: CLINIC | Age: 35
End: 2025-04-07
Payer: COMMERCIAL

## 2025-04-07 NOTE — TELEPHONE ENCOUNTER
Patient confirmed scheduled appointment:     Date: 9/26/25  Time: 2:30 PM  Visit type: Return Weight Management  Visit mode: Virtual Visit  Provider:  Opal Callaway PA-C  Location: [facility]    Additional Notes:

## 2025-04-14 ENCOUNTER — VIRTUAL VISIT (OUTPATIENT)
Dept: PHARMACY | Facility: CLINIC | Age: 35
End: 2025-04-14
Payer: COMMERCIAL

## 2025-04-14 VITALS — HEIGHT: 66 IN | BODY MASS INDEX: 29.5 KG/M2

## 2025-04-14 DIAGNOSIS — K59.03 DRUG INDUCED CONSTIPATION: ICD-10-CM

## 2025-04-14 DIAGNOSIS — E66.3 OVERWEIGHT (BMI 25.0-29.9): Primary | ICD-10-CM

## 2025-04-14 DIAGNOSIS — E55.9 VITAMIN D DEFICIENCY: ICD-10-CM

## 2025-04-14 DIAGNOSIS — R73.03 PREDIABETES: ICD-10-CM

## 2025-04-14 ASSESSMENT — PAIN SCALES - GENERAL: PAINLEVEL_OUTOF10: MILD PAIN (1)

## 2025-04-14 NOTE — PROGRESS NOTES
Medication Therapy Management (MTM) Encounter    ASSESSMENT:                            Medication Adherence/Access: No issues identified.    Weight Management /pre-diabetes:  Not interested in weight management medications today. Discussed incorporating protein and fiber with meals. Discussed increasing exercise and adding resistance training. Could check TSH due to feeling cold, tired, constipation, and dry skin. These symptoms could also be related to other things. Will discuss with Opal Callaway PA-C.     Constipation   Stable     Vitamin D Deficiency:  May benefit from rechecking vitamin D (was low in 2023 with intermittent vitamin D supplement use)   PLAN:                            Aim for 30g of protein 3 times daily and fiber with each meal to help stay full  Continue exercising regularly -work on adding in resistance training   Will discuss checking thyroid lab and rechecking vitamin D level with Opal Callaway PA-C    Follow-up: 5 months with Opal Callaway PA-C, 6 months with medication therapy management (if needed)    SUBJECTIVE/OBJECTIVE:                          Dax Beckman is a 34 year old female seen for a follow-up visit.       Reason for visit: weight management follow-up.    Allergies/ADRs: Reviewed in chart  Past Medical History: Reviewed in chart  Tobacco: She reports that she has never smoked. She has never been exposed to tobacco smoke. She has never used smokeless tobacco.  Alcohol: not currently using  Medication Adherence/Access: none    Weight Management /pre-diabetes:  No current medications     Following with Opal Callaway PA-C for medical weight management. Decided to stop Wegovy 3/8 due to side effects and wanting to try to maintain weight with lifestyle changes.  No more stomach cramping, indigestion, nausea. Increased hunger and hunger pains. Has been feeling cold the last few months while on Wegovy and after stopping Wegovy. Also has some constipation, dry skin, and  "feels tired.     Nutrition/Eating Habits: Eating 2 times daily - last breakfast and early dinner. Has been trying to cut back on portions. Incorporates protein, fruits, and vegetables with meals. Water: 48-64 ounces per day. Switched from regular Pepsi to coke zero a few times per week.   Exercise/Activity: busy around the house and taking care of 3 kids. She plans to start a weight lifting program with a friend.   Medications Tried/Failed:  Phentermine - irritation and abdominal pain   Saxenda- nausea and constipation, also ineffective    Initial Consult Weight: 215 lbs     Current weight today: 0 lbs 0 oz - doesn't have a scale at home. Thinks her weight has stayed the same.  Cumulative Weight Loss: -35 lb, -16.3% from baseline  She's happy with weight loss so far, would like to lose more.     Wt Readings from Last 4 Encounters:   03/25/25 180 lb (81.6 kg)   12/30/24 200 lb 12.8 oz (91.1 kg)   11/16/24 208 lb (94.3 kg)   10/23/24 208 lb 9.6 oz (94.6 kg)     Estimated body mass index is 29.5 kg/m  as calculated from the following:    Height as of this encounter: 5' 5.5\" (1.664 m).    Weight as of 3/25/25: 180 lb (81.6 kg).    Constipation   Miralax 17g once daily as needed   Patient feels that current therapy is effective.   Patient reports no current medication side effects.         Vitamin D Deficiency:  Not currently taking this      Today's Vitals: Ht 5' 5.5\" (1.664 m)   BMI 29.50 kg/m    ----------------      I spent 19 minutes with this patient today. All changes were made via collaborative practice agreement with Opal Callaway PA-C.     A summary of these recommendations was sent via [employer].      Telemedicine Visit Details  The patient's medications can be safely assessed via a telemedicine encounter.  Type of service:  Telephone visit  Originating Location (pt. Location): Home    Distant Location (provider location):  Off-site  Start Time: 2:09 PM  End Time: 2:28 PM     Medication Therapy " Recommendations  Vitamin D deficiency   1 Current Medication: cholecalciferol (VITAMIN D3) 125 mcg (5000 units) capsule   Current Medication Sig: Take 125 mcg by mouth daily.   Rationale: Patient forgets to take - Adherence - Adherence   Recommendation: Order Lab   Status: Contact Provider - Awaiting Response   Identified Date: 4/14/2025

## 2025-04-14 NOTE — NURSING NOTE
Current patient location: [address] [place] [address] [place] [address]    Is the patient currently in the state of [place]? YES    Visit mode: TELEPHONE    If the visit is dropped, the patient can be reconnected by:TELEPHONE VISIT: Phone number:   Telephone Information:   Mobile [phone]       Will anyone else be joining the visit? NO  (If patient encounters technical issues they should call [phone])    Are changes needed to the allergy or medication list? No    Are refills needed on medications prescribed by this physician? NO    Rooming Documentation:  Questionnaire(s) not pre-assigned    Reason for visit: Medication Therapy Management    Pt states no weight available within last week but believes around 185lb.      Pt states 1/10 chronic low back pain.    Jeromy PIPERF

## 2025-04-14 NOTE — Clinical Note
Hi! She is still moving forward with lifestyle changes. Not interested in medications right now. She reports feeling more cold than she normally does and also feels  tired,constipation, and dry skin. TSH was normal 7/2023. Do you think it's worth rechecking? Symptoms could also just be due to winter/weight loss. Last vitamin D was low at 12 in 2023 and hasn't been adherent to vitaminD. Thoughts on rechecking and could consider once weekly high dose ergocalciferol to help with adherence if still super low? Thank you- Elizabeth

## 2025-04-14 NOTE — PATIENT INSTRUCTIONS
"Recommendations from today's MTM visit:                                                      Aim for 30g of protein 3 times daily and fiber with each meal to help stay full  Continue exercising regularly -work on adding in resistance training   Will discuss checking thyroid lab and rechecking vitamin D level with Opal Callaway PA-C    Follow-up: 5 months with Opal Callaway PA-C, 6 months with medication therapy management (if needed)    It was great speaking with you today.  I value your experience and would be very thankful for your time in providing feedback in our clinic survey. In the next few days, you may receive an email or text message from [employer] with a link to a survey related to your  clinical pharmacist.\"     To schedule another MTM appointment, please call the clinic directly or you may call the MTM scheduling line at [phone].    My Clinical Pharmacist's contact information:                                                      Please feel free to contact me with any questions or concerns you have.      Elizabeth Simental, PharmD, AAHIVP  Medication Therapy Management Pharmacist      "

## 2025-04-28 ENCOUNTER — LAB (OUTPATIENT)
Dept: LAB | Facility: CLINIC | Age: 35
End: 2025-04-28
Payer: COMMERCIAL

## 2025-04-28 DIAGNOSIS — R53.83 OTHER FATIGUE: ICD-10-CM

## 2025-04-28 DIAGNOSIS — E66.3 OVERWEIGHT (BMI 25.0-29.9): ICD-10-CM

## 2025-04-28 LAB
ERYTHROCYTE [DISTWIDTH] IN BLOOD BY AUTOMATED COUNT: 13.2 % (ref 10–15)
HCT VFR BLD AUTO: 39.7 % (ref 35–47)
HGB BLD-MCNC: 12.9 G/DL (ref 11.7–15.7)
HOLD SPECIMEN: NORMAL
MCH RBC QN AUTO: 28.8 PG (ref 26.5–33)
MCHC RBC AUTO-ENTMCNC: 32.5 G/DL (ref 31.5–36.5)
MCV RBC AUTO: 89 FL (ref 78–100)
PLATELET # BLD AUTO: 313 10E3/UL (ref 150–450)
RBC # BLD AUTO: 4.48 10E6/UL (ref 3.8–5.2)
WBC # BLD AUTO: 7.8 10E3/UL (ref 4–11)

## 2025-04-28 PROCEDURE — 84439 ASSAY OF FREE THYROXINE: CPT

## 2025-04-28 PROCEDURE — 82728 ASSAY OF FERRITIN: CPT

## 2025-04-28 PROCEDURE — 85027 COMPLETE CBC AUTOMATED: CPT

## 2025-04-28 PROCEDURE — 83540 ASSAY OF IRON: CPT

## 2025-04-28 PROCEDURE — 36415 COLL VENOUS BLD VENIPUNCTURE: CPT

## 2025-04-28 PROCEDURE — 84443 ASSAY THYROID STIM HORMONE: CPT

## 2025-04-28 PROCEDURE — 82306 VITAMIN D 25 HYDROXY: CPT

## 2025-04-28 PROCEDURE — 83550 IRON BINDING TEST: CPT

## 2025-04-28 PROCEDURE — 82607 VITAMIN B-12: CPT

## 2025-04-29 LAB
FERRITIN SERPL-MCNC: 59 NG/ML (ref 6–175)
IRON BINDING CAPACITY (ROCHE): 248 UG/DL (ref 240–430)
IRON SATN MFR SERPL: 27 % (ref 15–46)
IRON SERPL-MCNC: 68 UG/DL (ref 37–145)
T4 FREE SERPL-MCNC: 1 NG/DL (ref 0.9–1.7)
TSH SERPL DL<=0.005 MIU/L-ACNC: 5.02 UIU/ML (ref 0.3–4.2)
VIT B12 SERPL-MCNC: 330 PG/ML (ref 232–1245)
VIT D+METAB SERPL-MCNC: 20 NG/ML (ref 20–50)

## 2025-06-12 ENCOUNTER — OFFICE VISIT (OUTPATIENT)
Dept: URGENT CARE | Facility: URGENT CARE | Age: 35
End: 2025-06-12
Payer: COMMERCIAL

## 2025-06-12 VITALS
OXYGEN SATURATION: 99 % | RESPIRATION RATE: 18 BRPM | DIASTOLIC BLOOD PRESSURE: 72 MMHG | TEMPERATURE: 97.1 F | WEIGHT: 195.4 LBS | HEART RATE: 64 BPM | SYSTOLIC BLOOD PRESSURE: 102 MMHG | BODY MASS INDEX: 32.02 KG/M2

## 2025-06-12 DIAGNOSIS — H00.11 CHALAZION OF RIGHT UPPER EYELID: Primary | ICD-10-CM

## 2025-06-12 RX ORDER — ERYTHROMYCIN 5 MG/G
0.5 OINTMENT OPHTHALMIC 4 TIMES DAILY
Qty: 3.5 G | Refills: 0 | Status: SHIPPED | OUTPATIENT
Start: 2025-06-12 | End: 2025-06-19

## 2025-06-12 NOTE — PROGRESS NOTES
Urgent Care Clinic Visit  Chief Complaint   Patient presents with    Eye Problem     Stye on right upper eyelid. Redness and swelling since yesterday. Pt tried warm compresses but sx not getting better.          6/12/2025    11:04 AM   Additional Questions   Roomed by Kaylynn   Accompanied by Self

## 2025-06-12 NOTE — PROGRESS NOTES
Assessment & Plan     Diagnosis:    ICD-10-CM    1. Chalazion of right upper eyelid  H00.11 erythromycin (ROMYCIN) 5 MG/GM ophthalmic ointment          Medical Decision Making:  Dax Beckman is a 34 year old female who presents for evaluation of redness in the right upper eyelid.  This is consistent with a chalazion.  There is a superimposed cellulitis.  No signs this has progressed to a more serious orbital cellulitis. There is no proptosis, vision changes, conjunctival changes.  Doubt other serious pathologies at this point. Medications for d/c noted above along with warm compresses. See eye or return if not improving in 1 week. Questions answered.      Ponce Ricardo PA-C  [facility]    Subjective     Dax Beckman is a 34 year old female who presents to clinic today for the following health issues:  Chief Complaint   Patient presents with    Eye Problem     Stye on right upper eyelid. Redness and swelling since yesterday. Pt tried warm compresses but sx not getting better.        HPI  Patient noticed redness and swelling, painful lump in the right upper eyelid.  She is been using warm compresses which helped a little bit, but it is still swollen and little painful.  No vision changes, discharge or crusting from the eye, redness in the eye, headaches or other concerns.    Review of Systems    See HPI    Objective      Vitals: /72 (BP Location: Left arm, Cuff Size: Adult Large)   Pulse 64   Temp 97.1  F (36.2  C) (Tympanic)   Resp 18   Wt 88.6 kg (195 lb 6.4 oz)   SpO2 99%   BMI 32.02 kg/m        Patient Vitals for the past 24 hrs:   BP Temp Temp src Pulse Resp SpO2 Weight   06/12/25 1104 102/72 97.1  F (36.2  C) Tympanic 64 18 99 % 88.6 kg (195 lb 6.4 oz)       Vital signs reviewed by: Ponce Ricardo PA-C    Physical Exam   Constitutional: Patient is alert and cooperative. No acute distress.  Mouth: Mucous membranes are moist. Normal tongue and tonsil. Posterior oropharynx is  clear.  Eyes: R eyelid with swollen erythematous lump at the center of the eyelid, slightly tender.  No fluctuance or areas of pointing.  No periorbital erythema/swelling. Conjunctivae, EOMI are normal. PERRL.  Neurological: . CN 3-7 normal.  Skin: No rash noted on visualized skin.  Psychiatric:The patient has a normal mood and affect.       Ponce Ricardo PA-C, June 12, 2025

## 2025-06-30 ENCOUNTER — TELEPHONE (OUTPATIENT)
Dept: ENDOCRINOLOGY | Facility: CLINIC | Age: 35
End: 2025-06-30
Payer: COMMERCIAL

## 2025-06-30 NOTE — TELEPHONE ENCOUNTER
Patient confirmed scheduled appointment:     Date: 7/2/25  Time: 2PM  Visit type: Return MTM  Visit mode: Virtual Visit  Provider:  Elizabeth Simental RPH  Location: [facility] [place] [facility]    Additional Notes: Discuss restarting meds    Patient confirmed scheduled appointment:     Date: 10/30/25 & Wait list  Time: 4:30 PM  Visit type: Return Weight Management  Visit mode: Virtual Visit  Provider:  Opal Callaway PA-C  Location: [facility] [place] [facility]    Additional Notes:   Rescheduled from 9/26/25 with Cesia

## 2025-07-02 ENCOUNTER — TELEPHONE (OUTPATIENT)
Dept: ENDOCRINOLOGY | Facility: CLINIC | Age: 35
End: 2025-07-02
Payer: COMMERCIAL

## 2025-07-02 ENCOUNTER — VIRTUAL VISIT (OUTPATIENT)
Dept: PHARMACY | Facility: CLINIC | Age: 35
End: 2025-07-02
Attending: PHARMACIST
Payer: COMMERCIAL

## 2025-07-02 DIAGNOSIS — K59.00 CONSTIPATION, UNSPECIFIED CONSTIPATION TYPE: ICD-10-CM

## 2025-07-02 DIAGNOSIS — Z78.9 TAKES DIETARY SUPPLEMENTS: ICD-10-CM

## 2025-07-02 DIAGNOSIS — E66.811 CLASS 1 OBESITY WITHOUT SERIOUS COMORBIDITY WITH BODY MASS INDEX (BMI) OF 31.0 TO 31.9 IN ADULT, UNSPECIFIED OBESITY TYPE: Primary | ICD-10-CM

## 2025-07-02 DIAGNOSIS — R73.03 PREDIABETES: ICD-10-CM

## 2025-07-02 ASSESSMENT — PAIN SCALES - GENERAL: PAINLEVEL_OUTOF10: MILD PAIN (2)

## 2025-07-02 NOTE — TELEPHONE ENCOUNTER
"Prior Authorization Retail Medication Request    Medication/Dose: Zepbound 2.5 mg once weekly   Diagnosis and ICD code (if different than what is on RX):    New/renewal/insurance change PA/secondary ins. PA:    Previously Tried and Failed:  diet and exercise for at least 3 months without clinically effective sustainable weight loss  Medications Tried/Failed:  Phentermine - irritation and abdominal pain   Saxenda- nausea and constipation, also ineffective  Wegovy - nausea, indigestion, constipation, fatigue. Stopped due to not feeling good on it. Took from 7/2023 to 4/2025.     Rationale: Dax Beckman is a 34 year old female with a diagnosis of Obesity (BMI at least 30 kg/m2).     Estimated body mass index is 32.02 kg/m  as calculated from the following:    Height as of 4/14/25: 5' 5.5\" (1.664 m).    Weight as of 6/12/25: 195 lb 6.4 oz (88.6 kg).     Insurance   [facility]  Insurance ID:  [id number]     Clinic Information  Preferred routing pool for dept communication: [facility]       "

## 2025-07-02 NOTE — NURSING NOTE
Is the patient currently in the state of [place]? yes    Location:  home    Visit mode:VIDEO    If the visit is dropped, the patient can be reconnected by: VIDEO VISIT: Text to cell phone:   Telephone Information:   Mobile [phone]    and VIDEO VISIT: Send to e-mail at: [email or web address]    Will anyone else be joining the visit? NO  (If patient encounters technical issues they should call [phone])    Are changes needed to the allergy or medication list? No    Are refills needed on medications prescribed by this physician? NO    Reason for visit: Medication Therapy Management      Verenice Brian, Virtual Visit Facilitator    QNR Status: completed

## 2025-07-02 NOTE — PATIENT INSTRUCTIONS
"Recommendations from today's MTM visit:                                                      Start Zepbound 2.5 mg once weekly for 4 weeks, then increase to 5 mg once weekly   Start vitamin D 2000 units once daily   Start vitamin B12 500 mcg once daily   Recheck vitamin D and vitamin B12 levels 2 months after starting these - can order next visit     [facility]   [place], [place] - [address]  Phone: [phone]    Follow-up: 6 weeks with medication therapy management, 3 months with Opal Callaway PA-C    It was great speaking with you today.  I value your experience and would be very thankful for your time in providing feedback in our clinic survey. In the next few days, you may receive an email or text message from [employer] with a link to a survey related to your  clinical pharmacist.\"     To schedule another MTM appointment, please call the clinic directly or you may call the MTM scheduling line at [phone].    My Clinical Pharmacist's contact information:                                                      Please feel free to contact me with any questions or concerns you have.      Elizabeth Simental, PharmD, AAHIVP  Medication Therapy Management Pharmacist        Zepbound Dosing:   Start Zepbound: It is a subcutaneous injection that you inject once weekly and titrate the dose slowly over time. Make sure inject the same time each day of the week, for example Monday evenings.  Each pen is single use.  In each prescription, you will get 4 pens in each box.  You will need a new prescription for each strength of the medication.  Week 1-4: Inject 2.5 mg once weekly  Week 5-8: If tolerating, can increase to 5 mg once weekly if necessary  Week 9-12: If tolerating, can increase to 7.5 mg once weekly if necessary  Week 13-16: If tolerating, can increase to 10 mg once weekly if necessary  Week 17-20: If tolerating, can increase to 12.5 mg once weekly if necessary  Week 21 and on: if " tolerating, can increase to 15 mg once weekly if necessary    The goal is to get to a dose that is well tolerated and effective for you. You do not have to go up on the dose each month or get to maximum dose if getting an effective response with minimal side effects.     *If you are having some nausea or other side effects to where you are hesitant to move up to the next dose, stay at the same dose you are on for an additional week to see if side effect(s) improves/resolves. Make sure to take this time to hydrate and ensure you are drinking at least 64 oz water per day. If you are wanting to stay at the same dose and do not have additional refills on that prescription please reach out to the clinic.    Zepbound Administration Video: Video that was created by the .  https://www.zepbHeliae/how-to-use    Zepbound Copay Card:  https://www.zepbound.[employer]/coverage-savings    Zepbound Storage and Stability:   Make sure that when you get the prescription that you store the prescription in the refrigerator until it is time to use the Zepbound pen.  Once it is time to use the Zepbound pen, you can keep the pen at room temperature and it is good for up to 21 days at room temperature.     Zepbound Common Side Effects:   Nausea, diarrhea, constipation, headache, tiredness (fatigue), dizziness, stomach upset/pain. Less commonly, Zepbound can cause low blood sugar (symptoms: shaky, dizzy, sweaty, agitation). Please reach out to the care team should you feel like this is occurring. It is important to ensure that you are eating consistent meals and not skipping meals. Ensure you are getting at least 64 oz water daily.

## 2025-07-02 NOTE — TELEPHONE ENCOUNTER
PA Initiation    Medication: ZEPBOUND 2.5 MG/0.5ML SC SOAJ  Insurance Company: [employer] - Phone [phone] Fax [phone]  Pharmacy Filling the Rx:    Filling Pharmacy Phone:    Filling Pharmacy Fax:    Start Date: 7/2/2025    Key: [id number]

## 2025-07-02 NOTE — PROGRESS NOTES
Medication Therapy Management (MTM) Encounter    ASSESSMENT:                            Medication Adherence/Access: No issues identified.    Weight Management   /pre-diabetes:  Weight has increased 15 lbs since stopping Wegovy. She'd like to try Zepbound instead since she had side effects on Wegovy. She will continue to work on lifestyle adjustments.     Constipation   Continue to monitor after starting Zepbound. Consider daily fiber supplement or increasing Miralax frequency if needed     Supplements   Discussed lab results and starting vitamin D and vitamin B12. Repeat labs 2 months after starting these. She is agreeable.    PLAN:                            Start Zepbound 2.5 mg once weekly for 4 weeks, then increase to 5 mg once weekly   Start vitamin D 2000 units once daily   Start vitamin B12 500 mcg once daily   Recheck vitamin D and vitamin B12 levels 2 months after starting these - can order next visit     Follow-up: 6 weeks with medication therapy management, 3 months with Opal Callaway PA-C    SUBJECTIVE/OBJECTIVE:                          Dax Beckman is a 34 year old female seen for a follow-up visit.       Reason for visit: weight management follow-up.    Allergies/ADRs: Reviewed in chart  Past Medical History: Reviewed in chart  Tobacco: She reports that she has never smoked. She has never been exposed to tobacco smoke. She has never used smokeless tobacco.  Alcohol: not currently using  Medication Adherence/Access: none    Weight Management   /pre-diabetes:  No current medications     Following with Opal Callaway PA-C for medical weight management. Had a lot of hunger pains and stomach cramping after stopping Wegovy. Had to increase dietary intake as a result. Fatigue and coldness has improved as she's continued off Wegovy. Currently recovering from the flu.     Nutrition/Eating Habits: increased portions and overall intake. Tries to snack on fruit. When on Wegovy, was eating 2 times daily,  "reducing portions, and incorporating protein, fruits, and vegetables.  Exercise/Activity: busy around the house and taking care of 3 kids.     Medications Tried/Failed:  Phentermine - irritation and abdominal pain   Saxenda- nausea and constipation, also ineffective  Wegovy - nausea, indigestion, constipation, fatigue. Stopped due to not feeling good on it. Took from 7/2023 to 4/2025.     Initial Consult Weight: 215 lbs     Current weight today: no updated weight, not checking at home  Cumulative Weight Loss: -20 lb, -9.3% from baseline    Wt Readings from Last 4 Encounters:   06/12/25 195 lb 6.4 oz (88.6 kg)   03/25/25 180 lb (81.6 kg)   12/30/24 200 lb 12.8 oz (91.1 kg)   11/16/24 208 lb (94.3 kg)     Estimated body mass index is 32.02 kg/m  as calculated from the following:    Height as of 4/14/25: 5' 5.5\" (1.664 m).    Weight as of 6/12/25: 195 lb 6.4 oz (88.6 kg).      Constipation   Miralax 17g once daily as needed   Patient feels that current therapy is effective. Bowel movement daily when she uses Miralax consistently, otherwise goes every 2-3 days.   Patient reports no current medication side effects.         Supplements   No current medications     Had labs done a few months ago. Vitamin D and vitamin B12 were at low end of normal. Opal Callaway PA-C recommended starting vitamin B12 500 mcg once daily and vitamin D 2000 units once daily and recheck in 2 months. She didn't see this message and hasn't started these yet. Fatigue has improved since being off Wegovy longer.          Today's Vitals: There were no vitals taken for this visit.  ----------------    I spent 14 minutes with this patient today. All changes were made via collaborative practice agreement with Opal Callaway PA-C.     A summary of these recommendations was sent via [employer].    Telemedicine Visit Details  The patient's medications can be safely assessed via a telemedicine encounter.  Type of service:  Video Conference via " [employer]  Originating Location (pt. Location): Home    Distant Location (provider location):  Off-site  Start Time: 2:03 pm  End Time: 2:17 pm     Medication Therapy Recommendations  Class 1 obesity without serious comorbidity with body mass index (BMI) of 31.0 to 31.9 in adult, unspecified obesity type   1 Rationale: Untreated condition - Needs additional medication therapy - Indication   Recommendation: Start Medication - Zepbound 2.5 MG/0.5ML Soaj   Status: Accepted per CPA   Identified Date: 7/2/2025 Completed Date: 7/2/2025

## 2025-07-07 NOTE — TELEPHONE ENCOUNTER
Prior Authorization Approval    Medication: ZEPBOUND 2.5 MG/0.5ML SC SOAJ  Authorization Effective Date: 7/3/2025  Authorization Expiration Date: 1/3/2026  Approved Dose/Quantity: uud  Reference #: Key: [id number]   Insurance Company: [employer] - Phone [phone] Fax [phone]  Expected CoPay: $    CoPay Card Available:      Financial Assistance Needed:    Which Pharmacy is filling the prescription:

## 2025-07-27 ENCOUNTER — OFFICE VISIT (OUTPATIENT)
Dept: URGENT CARE | Facility: URGENT CARE | Age: 35
End: 2025-07-27
Payer: COMMERCIAL

## 2025-07-27 VITALS
OXYGEN SATURATION: 98 % | HEIGHT: 66 IN | BODY MASS INDEX: 31.5 KG/M2 | DIASTOLIC BLOOD PRESSURE: 70 MMHG | RESPIRATION RATE: 16 BRPM | SYSTOLIC BLOOD PRESSURE: 102 MMHG | HEART RATE: 66 BPM | WEIGHT: 196 LBS | TEMPERATURE: 98.2 F

## 2025-07-27 DIAGNOSIS — N76.0 BV (BACTERIAL VAGINOSIS): ICD-10-CM

## 2025-07-27 DIAGNOSIS — B37.31 YEAST INFECTION OF THE VAGINA: ICD-10-CM

## 2025-07-27 DIAGNOSIS — B96.89 BV (BACTERIAL VAGINOSIS): ICD-10-CM

## 2025-07-27 DIAGNOSIS — N89.8 VAGINAL DISCHARGE: Primary | ICD-10-CM

## 2025-07-27 LAB
CLUE CELLS: PRESENT
TRICHOMONAS, WET PREP: ABNORMAL
WBC'S/HIGH POWER FIELD, WET PREP: ABNORMAL
YEAST, WET PREP: PRESENT

## 2025-07-27 PROCEDURE — 3074F SYST BP LT 130 MM HG: CPT | Performed by: NURSE PRACTITIONER

## 2025-07-27 PROCEDURE — 1126F AMNT PAIN NOTED NONE PRSNT: CPT | Performed by: NURSE PRACTITIONER

## 2025-07-27 PROCEDURE — 3078F DIAST BP <80 MM HG: CPT | Performed by: NURSE PRACTITIONER

## 2025-07-27 PROCEDURE — 99213 OFFICE O/P EST LOW 20 MIN: CPT | Performed by: NURSE PRACTITIONER

## 2025-07-27 PROCEDURE — 87210 SMEAR WET MOUNT SALINE/INK: CPT | Performed by: NURSE PRACTITIONER

## 2025-07-27 RX ORDER — CLINDAMYCIN HYDROCHLORIDE 300 MG/1
300 CAPSULE ORAL 2 TIMES DAILY
Qty: 14 CAPSULE | Refills: 0 | Status: SHIPPED | OUTPATIENT
Start: 2025-07-27 | End: 2025-08-03

## 2025-07-27 RX ORDER — FLUCONAZOLE 150 MG/1
150 TABLET ORAL
Qty: 2 TABLET | Refills: 0 | Status: SHIPPED | OUTPATIENT
Start: 2025-07-27 | End: 2025-07-31

## 2025-07-27 ASSESSMENT — PAIN SCALES - GENERAL: PAINLEVEL_OUTOF10: NO PAIN (0)

## 2025-07-27 NOTE — PROGRESS NOTES
"SUBJECTIVE:   Dax Beckman is a 34 year old female who  presents today for a possible Vaginal infection  Symptoms of  been going on for 3week(s).  Hematuria no.  gradual onsetand moderate.  There is no history of fever, chills, nausea or vomiting.  This patient does not have a history of urinary tract infections. Patient denies pregnancy or STD concerns.    No past medical history on file.  Current Outpatient Medications   Medication Sig Dispense Refill    acetaminophen (TYLENOL) 325 MG tablet Take 1-2 tablets (325-650 mg) by mouth every 6 hours as needed for mild pain (Patient not taking: Reported on 6/12/2025) 100 tablet 1    cholecalciferol (VITAMIN D3) 125 mcg (5000 units) capsule Take 125 mcg by mouth daily. (Patient not taking: Reported on 6/12/2025)      levonorgestrel (MIRENA) 20 MCG/24HR IUD 1 each (20 mcg) by Intrauterine route continuous      polyethylene glycol (MIRALAX) 17 GM/Dose powder Take 17 g by mouth daily. 850 g 3    tirzepatide-Weight Management (ZEPBOUND) 2.5 MG/0.5ML prefilled pen Inject 0.5 mLs (2.5 mg) subcutaneously every 7 days. 2 mL 0    tirzepatide-Weight Management (ZEPBOUND) 5 MG/0.5ML prefilled pen Inject 0.5 mLs (5 mg) subcutaneously every 7 days. 2 mL 2         ROS:   Review of systems negative except as stated above.    OBJECTIVE:  /70 (BP Location: Right arm, Patient Position: Sitting, Cuff Size: Adult Regular)   Pulse 66   Temp 98.2  F (36.8  C) (Tympanic)   Resp 16   Ht 1.67 m (5' 5.75\")   Wt 88.9 kg (196 lb)   LMP 07/06/2025 (Approximate)   SpO2 98%   Breastfeeding No   BMI 31.88 kg/m    GENERAL APPEARANCE: healthy, alert and no distress  RESP: lungs clear to auscultation - no rales, rhonchi or wheezes  CV: regular rates and rhythm, normal S1 S2, no murmur noted  SKIN: no suspicious lesions or rashes    Results for orders placed or performed in visit on 07/27/25   Wet preparation     Status: Abnormal    Specimen: Vagina; Swab   Result Value Ref Range    " Trichomonas Absent Absent    Yeast Present (A) Absent    Clue Cells Present (A) Absent    WBCs/high power field 4+ (A) None         ASSESSMENT:     ICD-10-CM    1. Vaginal discharge  N89.8 Wet preparation      2. BV (bacterial vaginosis)  N76.0 clindamycin (CLEOCIN) 300 MG capsule    B96.89       3. Yeast infection of the vagina  B37.31 fluconazole (DIFLUCAN) 150 MG tablet            PLAN:  Will treat with a course of Diflucan and clindamycin  RTC if any worsening symptoms or if not improving as expected.    Iqra Isabel APRN CNP

## 2025-07-27 NOTE — PROGRESS NOTES
Urgent Care Clinic Visit    Chief Complaint   Patient presents with    Urgent Care     Vaginal discharge started 2 days ago.   Vaginal itching for 3 weeks.                7/27/2025     9:49 AM   Additional Questions   Roomed by MICHAEL Omalley   Accompanied by Self     Pre-Provider Visit Orders - Vaginal Infection  Reason for visit:  Vaginal discharge

## 2025-07-28 ENCOUNTER — PATIENT OUTREACH (OUTPATIENT)
Dept: CARE COORDINATION | Facility: CLINIC | Age: 35
End: 2025-07-28
Payer: COMMERCIAL

## 2025-08-12 ENCOUNTER — OFFICE VISIT (OUTPATIENT)
Dept: FAMILY MEDICINE | Facility: CLINIC | Age: 35
End: 2025-08-12
Payer: COMMERCIAL

## 2025-08-12 VITALS
RESPIRATION RATE: 16 BRPM | BODY MASS INDEX: 32.76 KG/M2 | OXYGEN SATURATION: 98 % | WEIGHT: 196.6 LBS | SYSTOLIC BLOOD PRESSURE: 114 MMHG | DIASTOLIC BLOOD PRESSURE: 80 MMHG | HEIGHT: 65 IN | HEART RATE: 63 BPM | TEMPERATURE: 97 F

## 2025-08-12 DIAGNOSIS — E66.01 CLASS 2 SEVERE OBESITY WITH SERIOUS COMORBIDITY AND BODY MASS INDEX (BMI) OF 35.0 TO 35.9 IN ADULT, UNSPECIFIED OBESITY TYPE (H): ICD-10-CM

## 2025-08-12 DIAGNOSIS — Z00.00 ROUTINE GENERAL MEDICAL EXAMINATION AT A HEALTH CARE FACILITY: Primary | ICD-10-CM

## 2025-08-12 DIAGNOSIS — E66.812 CLASS 2 SEVERE OBESITY WITH SERIOUS COMORBIDITY AND BODY MASS INDEX (BMI) OF 35.0 TO 35.9 IN ADULT, UNSPECIFIED OBESITY TYPE (H): ICD-10-CM

## 2025-08-12 DIAGNOSIS — Z30.431 INTRAUTERINE DEVICE SURVEILLANCE: ICD-10-CM

## 2025-08-12 SDOH — HEALTH STABILITY: PHYSICAL HEALTH: ON AVERAGE, HOW MANY DAYS PER WEEK DO YOU ENGAGE IN MODERATE TO STRENUOUS EXERCISE (LIKE A BRISK WALK)?: 1 DAY

## 2025-08-12 ASSESSMENT — PAIN SCALES - GENERAL: PAINLEVEL_OUTOF10: MILD PAIN (2)

## 2025-08-12 ASSESSMENT — SOCIAL DETERMINANTS OF HEALTH (SDOH): HOW OFTEN DO YOU GET TOGETHER WITH FRIENDS OR RELATIVES?: ONCE A WEEK

## 2025-08-14 ENCOUNTER — VIRTUAL VISIT (OUTPATIENT)
Dept: PHARMACY | Facility: CLINIC | Age: 35
End: 2025-08-14
Payer: COMMERCIAL

## 2025-08-14 VITALS — HEIGHT: 65 IN | BODY MASS INDEX: 32.65 KG/M2 | WEIGHT: 196 LBS

## 2025-08-14 DIAGNOSIS — R73.03 PREDIABETES: ICD-10-CM

## 2025-08-14 DIAGNOSIS — K59.00 CONSTIPATION, UNSPECIFIED CONSTIPATION TYPE: ICD-10-CM

## 2025-08-14 DIAGNOSIS — E66.811 CLASS 1 OBESITY WITH SERIOUS COMORBIDITY AND BODY MASS INDEX (BMI) OF 32.0 TO 32.9 IN ADULT, UNSPECIFIED OBESITY TYPE: Primary | ICD-10-CM

## 2025-08-14 PROBLEM — E66.09 CLASS 1 OBESITY DUE TO EXCESS CALORIES WITH SERIOUS COMORBIDITY AND BODY MASS INDEX (BMI) OF 34.0 TO 34.9 IN ADULT: Status: RESOLVED | Noted: 2023-07-25 | Resolved: 2025-08-14

## 2025-08-14 ASSESSMENT — PAIN SCALES - GENERAL: PAINLEVEL_OUTOF10: MILD PAIN (3)
